# Patient Record
Sex: FEMALE | Race: WHITE | HISPANIC OR LATINO | Employment: FULL TIME | URBAN - METROPOLITAN AREA
[De-identification: names, ages, dates, MRNs, and addresses within clinical notes are randomized per-mention and may not be internally consistent; named-entity substitution may affect disease eponyms.]

---

## 2017-12-04 ENCOUNTER — HOSPITAL ENCOUNTER (EMERGENCY)
Facility: HOSPITAL | Age: 27
Discharge: HOME | End: 2017-12-04
Attending: EMERGENCY MEDICINE
Payer: COMMERCIAL

## 2017-12-04 ENCOUNTER — APPOINTMENT (EMERGENCY)
Dept: CT IMAGING | Facility: HOSPITAL | Age: 27
End: 2017-12-04
Payer: COMMERCIAL

## 2017-12-04 VITALS
TEMPERATURE: 97.4 F | WEIGHT: 108.03 LBS | SYSTOLIC BLOOD PRESSURE: 107 MMHG | RESPIRATION RATE: 16 BRPM | HEART RATE: 88 BPM | OXYGEN SATURATION: 100 % | DIASTOLIC BLOOD PRESSURE: 63 MMHG

## 2017-12-04 DIAGNOSIS — R10.9 ABDOMINAL PAIN: ICD-10-CM

## 2017-12-04 DIAGNOSIS — N83.209 RUPTURED OVARIAN CYST: Primary | ICD-10-CM

## 2017-12-04 LAB
ALBUMIN SERPL BCP-MCNC: 4.2 G/DL (ref 3.5–5)
ALP SERPL-CCNC: 82 U/L (ref 46–116)
ALT SERPL W P-5'-P-CCNC: 25 U/L (ref 12–78)
ANION GAP SERPL CALCULATED.3IONS-SCNC: 9 MMOL/L (ref 4–13)
APTT PPP: 30 SECONDS (ref 23–35)
AST SERPL W P-5'-P-CCNC: 21 U/L (ref 5–45)
BASOPHILS # BLD AUTO: 0.02 THOUSANDS/ΜL (ref 0–0.1)
BASOPHILS NFR BLD AUTO: 0 % (ref 0–1)
BILIRUB SERPL-MCNC: 0.8 MG/DL (ref 0.2–1)
BUN SERPL-MCNC: 15 MG/DL (ref 5–25)
CALCIUM SERPL-MCNC: 9.6 MG/DL (ref 8.3–10.1)
CHLORIDE SERPL-SCNC: 105 MMOL/L (ref 100–108)
CLARITY, POC: CLEAR
CO2 SERPL-SCNC: 26 MMOL/L (ref 21–32)
COLOR, POC: YELLOW
CREAT SERPL-MCNC: 0.7 MG/DL (ref 0.6–1.3)
EOSINOPHIL # BLD AUTO: 0.04 THOUSAND/ΜL (ref 0–0.61)
EOSINOPHIL NFR BLD AUTO: 1 % (ref 0–6)
ERYTHROCYTE [DISTWIDTH] IN BLOOD BY AUTOMATED COUNT: 12.1 % (ref 11.6–15.1)
EXT BILIRUBIN, UA: NEGATIVE
EXT BLOOD URINE: NORMAL
EXT GLUCOSE, UA: NEGATIVE
EXT KETONES: NEGATIVE
EXT NITRITE, UA: NEGATIVE
EXT PH, UA: 5
EXT PREG TEST URINE: NEGATIVE
EXT PROTEIN, UA: NEGATIVE
EXT SPECIFIC GRAVITY, UA: 1.01
EXT UROBILINOGEN: 0.2
GFR SERPL CREATININE-BSD FRML MDRD: 121 ML/MIN/1.73SQ M
GLUCOSE SERPL-MCNC: 93 MG/DL (ref 65–140)
HCT VFR BLD AUTO: 44.4 % (ref 34.8–46.1)
HGB BLD-MCNC: 14.9 G/DL (ref 11.5–15.4)
HOLD SPECIMEN: NORMAL
INR PPP: 0.9 (ref 0.86–1.16)
LIPASE SERPL-CCNC: 105 U/L (ref 73–393)
LYMPHOCYTES # BLD AUTO: 2 THOUSANDS/ΜL (ref 0.6–4.47)
LYMPHOCYTES NFR BLD AUTO: 29 % (ref 14–44)
MCH RBC QN AUTO: 29 PG (ref 26.8–34.3)
MCHC RBC AUTO-ENTMCNC: 33.6 G/DL (ref 31.4–37.4)
MCV RBC AUTO: 86 FL (ref 82–98)
MONOCYTES # BLD AUTO: 0.43 THOUSAND/ΜL (ref 0.17–1.22)
MONOCYTES NFR BLD AUTO: 6 % (ref 4–12)
NEUTROPHILS # BLD AUTO: 4.32 THOUSANDS/ΜL (ref 1.85–7.62)
NEUTS SEG NFR BLD AUTO: 64 % (ref 43–75)
PLATELET # BLD AUTO: 246 THOUSANDS/UL (ref 149–390)
PMV BLD AUTO: 10.3 FL (ref 8.9–12.7)
POTASSIUM SERPL-SCNC: 4.1 MMOL/L (ref 3.5–5.3)
PROT SERPL-MCNC: 7.8 G/DL (ref 6.4–8.2)
PROTHROMBIN TIME: 12.4 SECONDS (ref 12.1–14.4)
RBC # BLD AUTO: 5.14 MILLION/UL (ref 3.81–5.12)
SODIUM SERPL-SCNC: 140 MMOL/L (ref 136–145)
WBC # BLD AUTO: 6.81 THOUSAND/UL (ref 4.31–10.16)
WBC # BLD EST: NEGATIVE 10*3/UL

## 2017-12-04 PROCEDURE — 81002 URINALYSIS NONAUTO W/O SCOPE: CPT | Performed by: PHYSICIAN ASSISTANT

## 2017-12-04 PROCEDURE — 85025 COMPLETE CBC W/AUTO DIFF WBC: CPT | Performed by: PHYSICIAN ASSISTANT

## 2017-12-04 PROCEDURE — 80053 COMPREHEN METABOLIC PANEL: CPT | Performed by: PHYSICIAN ASSISTANT

## 2017-12-04 PROCEDURE — 36415 COLL VENOUS BLD VENIPUNCTURE: CPT | Performed by: PHYSICIAN ASSISTANT

## 2017-12-04 PROCEDURE — 96374 THER/PROPH/DIAG INJ IV PUSH: CPT

## 2017-12-04 PROCEDURE — 83690 ASSAY OF LIPASE: CPT | Performed by: PHYSICIAN ASSISTANT

## 2017-12-04 PROCEDURE — 81025 URINE PREGNANCY TEST: CPT | Performed by: PHYSICIAN ASSISTANT

## 2017-12-04 PROCEDURE — 74177 CT ABD & PELVIS W/CONTRAST: CPT

## 2017-12-04 PROCEDURE — 96375 TX/PRO/DX INJ NEW DRUG ADDON: CPT

## 2017-12-04 PROCEDURE — 99284 EMERGENCY DEPT VISIT MOD MDM: CPT

## 2017-12-04 PROCEDURE — 96361 HYDRATE IV INFUSION ADD-ON: CPT

## 2017-12-04 PROCEDURE — 85730 THROMBOPLASTIN TIME PARTIAL: CPT | Performed by: PHYSICIAN ASSISTANT

## 2017-12-04 PROCEDURE — 85610 PROTHROMBIN TIME: CPT | Performed by: PHYSICIAN ASSISTANT

## 2017-12-04 RX ORDER — KETOROLAC TROMETHAMINE 30 MG/ML
15 INJECTION, SOLUTION INTRAMUSCULAR; INTRAVENOUS ONCE
Status: COMPLETED | OUTPATIENT
Start: 2017-12-04 | End: 2017-12-04

## 2017-12-04 RX ORDER — ONDANSETRON 4 MG/1
4 TABLET, FILM COATED ORAL EVERY 8 HOURS PRN
Qty: 10 TABLET | Refills: 0 | Status: SHIPPED | OUTPATIENT
Start: 2017-12-04 | End: 2018-06-23

## 2017-12-04 RX ORDER — ONDANSETRON 2 MG/ML
4 INJECTION INTRAMUSCULAR; INTRAVENOUS ONCE
Status: COMPLETED | OUTPATIENT
Start: 2017-12-04 | End: 2017-12-04

## 2017-12-04 RX ORDER — NAPROXEN 500 MG/1
500 TABLET ORAL 2 TIMES DAILY WITH MEALS
Qty: 14 TABLET | Refills: 0 | Status: SHIPPED | OUTPATIENT
Start: 2017-12-04 | End: 2018-06-23

## 2017-12-04 RX ADMIN — IOHEXOL 100 ML: 350 INJECTION, SOLUTION INTRAVENOUS at 10:02

## 2017-12-04 RX ADMIN — ONDANSETRON 4 MG: 2 INJECTION INTRAMUSCULAR; INTRAVENOUS at 10:34

## 2017-12-04 RX ADMIN — KETOROLAC TROMETHAMINE 15 MG: 30 INJECTION, SOLUTION INTRAMUSCULAR at 10:32

## 2017-12-04 RX ADMIN — SODIUM CHLORIDE 1000 ML: 0.9 INJECTION, SOLUTION INTRAVENOUS at 08:39

## 2017-12-04 NOTE — DISCHARGE INSTRUCTIONS
Abdominal Pain, Ambulatory Care   GENERAL INFORMATION:   Abdominal pain  can be dull, achy, or sharp  You may have pain in one area of your abdomen, or in your entire abdomen  Your pain may be caused by constipation, food sensitivity or poisoning, infection, or a blockage  Abdominal pain can also be caused by a hernia, appendicitis, or an ulcer  The cause of your abdominal pain may be unknown  Seek immediate care for the following symptoms:   · New chest pain or shortness of breath    · Pulsing pain in your upper abdomen or lower back that suddenly becomes constant    · Pain in the right lower abdominal area that worsens with movement    · Fever over 100 4°F (38°C) or shaking chills    · Vomiting and you cannot keep food or fluids down    · Pain does not improve or gets worse over the next 8 to 12 hours    · Blood in your vomit or bowel movements, or they look black and tarry    · Skin or the whites of your eyes turn yellow    · Large amount of vaginal bleeding that is not your monthly period  Treatment for abdominal pain  may include medicine to calm your stomach, prevent vomiting, or decrease pain  Follow up with your healthcare provider as directed:  Write down your questions so you remember to ask them during your visits  CARE AGREEMENT:   You have the right to help plan your care  Learn about your health condition and how it may be treated  Discuss treatment options with your caregivers to decide what care you want to receive  You always have the right to refuse treatment  The above information is an  only  It is not intended as medical advice for individual conditions or treatments  Talk to your doctor, nurse or pharmacist before following any medical regimen to see if it is safe and effective for you  © 2014 [address] is for End User's use only and may not be sold, redistributed or otherwise used for commercial purposes   All illustrations and images included in [facility] are the copyrighted property of [employer]  or [employer] Cedeno  Ruptured Ovarian Cyst   WHAT YOU NEED TO KNOW:   A ruptured ovarian cyst is a cyst that breaks open  A cyst is a sac that grows on an ovary  This sac usually contains fluid, but may sometimes have blood or tissue in it  A large cyst that ruptures may lead to problems that need immediate care  It is important to follow up with your healthcare provider to make sure your cyst went away completely with treatment  DISCHARGE INSTRUCTIONS:   Call 911 for any of the following:   · You are too weak or dizzy to stand up  Return to the emergency department if:   · You have severe pain in your pelvis or in your abdomen  · You have pain along with a fever, nausea, or vomiting  · You have signs of shock from blood loss, such as dizziness, cold or clammy skin, or fast breathing  Contact your healthcare provider if:   · You notice changes in your monthly periods, or you begin to have nausea or vomiting with your periods  · You have new or worsening symptoms  · Your pain does not get better with pain medicine  · You have pain during sex  · You have bleeding from your vagina that is not your period  · Your abdomen is swollen, or you have a full or heavy feeling in your lower abdomen  · You have trouble urinating  · You have questions or concerns about your condition or care  Medicines: You may need any of the following:  · NSAIDs , such as ibuprofen, help decrease swelling, pain, and fever  This medicine is available with or without a doctor's order  NSAIDs can cause stomach bleeding or kidney problems in certain people  If you take blood thinner medicine, always ask if NSAIDs are safe for you  Always read the medicine label and follow directions  Do not give these medicines to children under 10months of age without direction from your child's healthcare provider       · Antibiotics  may be given to prevent or fight an infection caused by bacteria  · Take your medicine as directed  Contact your healthcare provider if you think your medicine is not helping or if you have side effects  Tell him or her if you are allergic to any medicine  Keep a list of the medicines, vitamins, and herbs you take  Include the amounts, and when and why you take them  Bring the list or the pill bottles to follow-up visits  Carry your medicine list with you in case of an emergency  Manage or prevent a ruptured ovarian cyst:   · Apply heat where you have pain, as directed  Heat can help relieve mild pain  Use a heating pad (set on low) or hot water bottle  Wrap the pad or bottle in a towel before you apply it to your skin  Apply heat for 20 minutes every hour, or as directed  A warm bath may also help relieve the pain  · Ask when to come in for a follow-up examination  You may need another ultrasound 6 weeks after your cyst was treated  This will help make sure the cyst is no longer growing or causing health problems  You may also need ultrasound tests for 2 or 3 monthly periods to see how hormones affect your ovaries  · Ask about birth control pills  These may help reduce your risk for cysts  Ask your healthcare provider if birth control pills are right for you  The risk for a blood clot is higher if you take birth control pills, especially if you are older than 35 or smoke  · Have a pelvic exam every year  This may also be called a well woman visit  The exam will include a Pap smear to check for certain cancers  Your healthcare provider will also press on your abdomen to check for lumps or other problems  A pelvic exam can help find problems early  This makes treatment easier and more effective  Tell your healthcare provider if you notice any changes in your monthly periods   Examples include periods that start on a different day than usual, or are lighter or heavier than usual  Tell your provider if you have worse pain than usual, or if the pain is different than you had before  Follow up with your healthcare provider as directed:  Write down your questions so you remember to ask them during your visits  © 2017 [address] Information is for End User's use only and may not be sold, redistributed or otherwise used for commercial purposes  All illustrations and images included in CareNotes® are the copyrighted property of [employer] M [employer]  or Israel Cedeno  The above information is an  only  It is not intended as medical advice for individual conditions or treatments  Talk to your doctor, nurse or pharmacist before following any medical regimen to see if it is safe and effective for you

## 2017-12-04 NOTE — ED NOTES
Per pt, she had bad experience with previous CT scan  Per pt "they dye burned my veins"  CT notified, per CT they will talk with pt prior to CT       Rennis Sicard, RN  12/04/17 0358

## 2017-12-04 NOTE — ED NOTES
Pt complaining about IV bothering her  Pt states that it feels like pins and needles going down arm  Checked IV and had good blood return and slowed down her fluids  Pt states that it feels better        Coit Randall, RN  12/04/17 2814

## 2017-12-04 NOTE — ED PROVIDER NOTES
History  Chief Complaint   Patient presents with    Abdominal Pain     PT HERE FOR ABD PAIN SINCE 3 DAYS AGO  STARTED WITH A MIGRAINE AND TOOK otc MEDICATION AND SINCE THEN HAS BEEN FEELING NAUSEA AND PAIN  This 17-year-old female presents emergency room with a 3 day history of initially having a migraine and taking some Tylenol Sinus with 1 Advil with relief of her symptoms  She then noticed some periumbilical abdominal cramping with a sharp component occasionally  She complains of increased belching and flatus  She has had a decreased appetite  She states that when she moves around or bends over the pain is worsened  She noticed as at the end of urination that she has increased pain in her right lower quadrant  She has a history of having previous ovarian cyst   She started her period 2 days ago  She states this normal flow usually uses 3 pads per day and it lasts approximately 5 days in duration  She states there is no change with this period  She denies any fever chills  She denies any urinary frequency or hematuria  She denies any vaginal discharge  She is presently not sexually active  She has been sexually active in the past   She complains of nausea but no active vomiting  She denies any diarrhea but has not moved her bowels for 2 days  Past medical history is positive for migraines, recent sinus infection that she was treated with the Augmentin and prednisone, 2 weeks ago          History provided by:  Patient and parent  Abdominal Pain   Pain location:  Periumbilical  Pain quality: cramping and sharp    Pain radiates to:  RLQ  Pain severity:  Moderate  Onset quality:  Gradual  Duration:  3 days  Timing:  Constant  Progression:  Waxing and waning  Chronicity:  Recurrent (similiar to previous ovarian cyst)  Context: not alcohol use, not awakening from sleep, not diet changes, not eating, not laxative use, not medication withdrawal, not previous surgeries, not recent illness, not recent sexual activity, not recent travel, not retching, not sick contacts, not suspicious food intake and not trauma    Relieved by:  Nothing  Worsened by: Movement and urination  Ineffective treatments: motrin  Associated symptoms: anorexia, belching and constipation    Associated symptoms: no chest pain, no chills, no cough, no diarrhea, no dysuria, no fatigue, no fever, no flatus, no hematemesis, no hematochezia, no hematuria, no melena, no nausea, no shortness of breath, no sore throat, no vaginal bleeding, no vaginal discharge and no vomiting    Associated symptoms comment:  Currently having menses, started 2 days ago  Three pads per day usually last for 5 days total   Risk factors: no alcohol abuse, no aspirin use, not elderly, has not had multiple surgeries, no NSAID use, not obese, not pregnant and no recent hospitalization        None       Past Medical History:   Diagnosis Date    Asthma     Migraine     Ovarian cyst        History reviewed  No pertinent surgical history  History reviewed  No pertinent family history  I have reviewed and agree with the history as documented  Social History   Substance Use Topics    Smoking status: Never Smoker    Smokeless tobacco: Never Used    Alcohol use Yes        Review of Systems   Constitutional: Positive for activity change and appetite change  Negative for chills, diaphoresis, fatigue and fever  HENT: Negative for congestion, dental problem, ear discharge, ear pain, mouth sores, postnasal drip, rhinorrhea, sinus pressure and sore throat  Eyes: Negative for pain, discharge, redness and itching  Respiratory: Negative for cough, chest tightness and shortness of breath  Cardiovascular: Negative for chest pain and palpitations  Gastrointestinal: Positive for abdominal pain, anorexia and constipation  Negative for diarrhea, flatus, hematemesis, hematochezia, melena, nausea and vomiting     Genitourinary: Negative for dysuria, flank pain, frequency, hematuria, pelvic pain, urgency, vaginal bleeding and vaginal discharge  Musculoskeletal: Positive for back pain  Negative for arthralgias, gait problem and myalgias  Skin: Negative for rash  Hematological: Negative for adenopathy  Does not bruise/bleed easily  Psychiatric/Behavioral: Negative for confusion  All other systems reviewed and are negative  Physical Exam  ED Triage Vitals   Temperature Pulse Respirations Blood Pressure SpO2   12/04/17 0754 12/04/17 0751 12/04/17 0751 12/04/17 0751 12/04/17 0751   (!) 97 4 °F (36 3 °C) 93 18 130/94 100 %      Temp Source Heart Rate Source Patient Position - Orthostatic VS BP Location FiO2 (%)   12/04/17 0754 12/04/17 0751 12/04/17 0751 12/04/17 0751 --   Oral Monitor Sitting Right arm       Pain Score       12/04/17 0751       2           Orthostatic Vital Signs  Vitals:    12/04/17 0751 12/04/17 1015 12/04/17 1025   BP: 130/94 114/71 107/63   Pulse: 93 80 88   Patient Position - Orthostatic VS: Sitting Sitting Lying       Physical Exam   Constitutional: She is oriented to person, place, and time  She appears well-developed and well-nourished  No distress  HENT:   Head: Normocephalic  Right Ear: External ear normal    Left Ear: External ear normal    Nose: Nose normal    Mouth/Throat: Oropharynx is clear and moist    Eyes: Conjunctivae are normal  Right eye exhibits no discharge  Left eye exhibits no discharge  No scleral icterus  Neck: No JVD present  No tracheal deviation present  No thyromegaly present  Cardiovascular: Normal rate, regular rhythm and normal heart sounds  Exam reveals no gallop and no friction rub  No murmur heard  Pulmonary/Chest: Effort normal and breath sounds normal  No stridor  No respiratory distress  She has no wheezes  She has no rales  Abdominal: Soft  There is tenderness  There is guarding  There is no rebound  Tenderness and guarding in the right lower quadrant  No rebound tenderness     Musculoskeletal: She exhibits no edema  Lymphadenopathy:     She has no cervical adenopathy  Neurological: She is alert and oriented to person, place, and time  Skin: Skin is warm  Capillary refill takes less than 2 seconds  No rash noted  She is not diaphoretic  No erythema  Psychiatric: She has a normal mood and affect  Her behavior is normal  Judgment and thought content normal    Nursing note and vitals reviewed  ED Medications  Medications   sodium chloride 0 9 % bolus 1,000 mL (0 mL Intravenous Stopped 12/4/17 1042)   iohexol (OMNIPAQUE) 350 MG/ML injection (MULTI-DOSE) 100 mL (100 mL Intravenous Given 12/4/17 1002)   ketorolac (TORADOL) 30 mg/mL injection 15 mg (15 mg Intravenous Given 12/4/17 1032)   ondansetron (ZOFRAN) injection 4 mg (4 mg Intravenous Given 12/4/17 1034)       Diagnostic Studies  Results Reviewed     Procedure Component Value Units Date/Time    [place] draw [[id number]] Collected:  12/04/17 0840    Lab Status: In process Specimen:  Blood Updated:  12/04/17 1001    Narrative: The following orders were created for panel order [place] draw  Procedure                               Abnormality         Status                     ---------                               -----------         ------                     Wilene Pilar top on WFUB[[id number]]                                  In process                 Green / Black tube on [facility][[id number]]                        Final result                 Please view results for these tests on the individual orders      Comprehensive metabolic panel [[id number]] Collected:  12/04/17 0840    Lab Status:  Final result Specimen:  Blood from Arm, Right Updated:  12/04/17 0913     Sodium 140 mmol/L      Potassium 4 1 mmol/L      Chloride 105 mmol/L      CO2 26 mmol/L      Anion Gap 9 mmol/L      BUN 15 mg/dL      Creatinine 0 70 mg/dL      Glucose 93 mg/dL      Calcium 9 6 mg/dL      AST 21 U/L      ALT 25 U/L      Alkaline Phosphatase 82 U/L      Total Protein 7 8 g/dL Albumin 4 2 g/dL      Total Bilirubin 0 80 mg/dL      eGFR 121 ml/min/1 73sq m     Narrative:         National Kidney Disease Education Program recommendations are as follows:  GFR calculation is accurate only with a steady state creatinine  Chronic Kidney disease less than 60 ml/min/1 73 sq  meters  Kidney failure less than 15 ml/min/1 73 sq  meters  Lipase [[id number]]  (Normal) Collected:  12/04/17 0840    Lab Status:  Final result Specimen:  Blood from Arm, Right Updated:  12/04/17 0913     Lipase 105 u/L     Protime-INR [[id number]]  (Normal) Collected:  12/04/17 0840    Lab Status:  Final result Specimen:  Blood from Arm, Right Updated:  12/04/17 0903     Protime 12 4 seconds      INR 0 90    APTT [[id number]]  (Normal) Collected:  12/04/17 0840    Lab Status:  Final result Specimen:  Blood from Arm, Right Updated:  12/04/17 0903     PTT 30 seconds     Narrative:          Therapeutic Heparin Range = 60-90 seconds    CBC and differential [[id number]]  (Abnormal) Collected:  12/04/17 0840    Lab Status:  Final result Specimen:  Blood from Arm, Right Updated:  12/04/17 0851     WBC 6 81 Thousand/uL      RBC 5 14 (H) Million/uL      Hemoglobin 14 9 g/dL      Hematocrit 44 4 %      MCV 86 fL      MCH 29 0 pg      MCHC 33 6 g/dL      RDW 12 1 %      MPV 10 3 fL      Platelets 997 Thousands/uL      Neutrophils Relative 64 %      Lymphocytes Relative 29 %      Monocytes Relative 6 %      Eosinophils Relative 1 %      Basophils Relative 0 %      Neutrophils Absolute 4 32 Thousands/µL      Lymphocytes Absolute 2 00 Thousands/µL      Monocytes Absolute 0 43 Thousand/µL      Eosinophils Absolute 0 04 Thousand/µL      Basophils Absolute 0 02 Thousands/µL     POCT urinalysis dipstick [[id number]]  (Normal) Resulted:  12/04/17 0843    Lab Status:  Final result Specimen:  Urine Updated:  12/04/17 0843     Color, UA yellow     Clarity, UA clear     EXT Glucose, UA negative     EXT Bilirubin, UA (Ref: Negative) negative     EXT Ketones, UA (Ref: Negative) negative     EXT Spec Grav, UA 1 010     EXT Blood, UA (Ref: Negative) hemolyzed trace     EXT pH, UA 5 0     EXT Protein, UA (Ref: Negative) negative     EXT Urobilinogen, UA (Ref: 0 2- 1 0) 0 2     EXT Leukocytes, UA (Ref: Negative) negative     EXT Nitrite, UA (Ref: Negative) negative    POCT pregnancy, urine [[id number]]  (Normal) Resulted:  12/04/17 0842    Lab Status:  Final result Updated:  12/04/17 0843     EXT PREG TEST UR (Ref: Negative) negative                 CT abdomen pelvis with contrast   ED Interpretation by Yanick Jimenez PA-C (12/04 1021)   No CT evidence of acute appendicitis  Involuting bilateral ovarian follicles with small amount of fluid in the right adnexal extending into the pelvis physiologic  Final Result by Amanda Dangelo DO (12/04 1015)   1  No CT evidence of acute appendicitis  2   Involuting bilateral ovarian follicles with small amount of fluid in the right adnexa, extending into the pelvis, physiologic  A verbal report will be called by the reading room liaison following this dictation  Workstation performed: [id number]                    Procedures  Procedures       Phone Contacts  ED Phone Contact    ED Course  ED Course                                MDM  Number of Diagnoses or Management Options  Abdominal pain: new and requires workup  Ruptured ovarian cyst: new and requires workup     Amount and/or Complexity of Data Reviewed  Clinical lab tests: ordered and reviewed  Tests in the radiology section of CPT®: ordered and reviewed  Tests in the medicine section of CPT®: ordered and reviewed    Risk of Complications, Morbidity, and/or Mortality  Presenting problems: high  Diagnostic procedures: high  Management options: high  General comments: Patient presents to the emergency room in 3 day history of periumbilical pain that now is located in the right lower quadrant    She describes the pain as a crampy but does have a sharp component  She states she started her period, 3 days ago which has been normal  Her laboratory studies were reviewed  A CT scan was obtained to rule out appendicitis versus ovarian cyst   The CT scan results that demonstrated a ruptured ovarian cyst with normal physiological fluid  Patient was medicated with Toradol and sent home with a prescription for Naprosyn  She was instructed to follow up with her gynecologist within the next week due to the fact that  this is a recurrence problem  Patient Progress  Patient progress: stable    CritCare Time    Disposition  Final diagnoses:   Ruptured ovarian cyst - Acute right   Abdominal pain     Time reflects when diagnosis was documented in both MDM as applicable and the Disposition within this note     Time User Action Codes Description Comment    12/4/2017 10:22 AM White Res Add [N83 209] Ruptured ovarian cyst     12/4/2017 10:22 AM White Res Modify [N83 209] Ruptured ovarian cyst Acute right    12/4/2017 10:22 AM White Res Add [R10 9] Abdominal pain       ED Disposition     ED Disposition Condition Comment    Discharge  [facility] discharge to home/self care  Condition at discharge: Good        Follow-up Information     Follow up With Specialties Details Why Contact Info    Your gynecologist  Schedule an appointment as soon as possible for a visit in 1 week          Discharge Medication List as of 12/4/2017 10:31 AM      START taking these medications    Details   naproxen (NAPROSYN) 500 mg tablet Take 1 tablet by mouth 2 (two) times a day with meals, Starting Mon 12/4/2017, Print      ondansetron (ZOFRAN) 4 mg tablet Take 1 tablet by mouth every 8 (eight) hours as needed for nausea or vomiting for up to 10 doses, Starting Mon 12/4/2017, Print           No discharge procedures on file      ED Provider  Electronically Signed by           Eliecer Wong PA-C  12/04/17 0762

## 2017-12-05 ENCOUNTER — GENERIC CONVERSION - ENCOUNTER (OUTPATIENT)
Dept: OTHER | Facility: OTHER | Age: 27
End: 2017-12-05

## 2018-01-24 VITALS
HEIGHT: 60 IN | DIASTOLIC BLOOD PRESSURE: 62 MMHG | BODY MASS INDEX: 21.01 KG/M2 | SYSTOLIC BLOOD PRESSURE: 108 MMHG | WEIGHT: 107 LBS | HEART RATE: 81 BPM

## 2018-06-23 ENCOUNTER — HOSPITAL ENCOUNTER (EMERGENCY)
Facility: HOSPITAL | Age: 28
Discharge: HOME | End: 2018-06-23
Attending: EMERGENCY MEDICINE | Admitting: EMERGENCY MEDICINE
Payer: COMMERCIAL

## 2018-06-23 VITALS
DIASTOLIC BLOOD PRESSURE: 66 MMHG | BODY MASS INDEX: 20.88 KG/M2 | WEIGHT: 106.92 LBS | HEART RATE: 94 BPM | TEMPERATURE: 97.7 F | RESPIRATION RATE: 16 BRPM | SYSTOLIC BLOOD PRESSURE: 118 MMHG | OXYGEN SATURATION: 100 %

## 2018-06-23 DIAGNOSIS — S61.002A AVULSION OF SKIN OF LEFT THUMB, INITIAL ENCOUNTER: Primary | ICD-10-CM

## 2018-06-23 PROCEDURE — 99282 EMERGENCY DEPT VISIT SF MDM: CPT

## 2018-06-23 RX ORDER — LIDOCAINE HYDROCHLORIDE 10 MG/ML
5 INJECTION, SOLUTION EPIDURAL; INFILTRATION; INTRACAUDAL; PERINEURAL ONCE
Status: COMPLETED | OUTPATIENT
Start: 2018-06-23 | End: 2018-06-23

## 2018-06-23 RX ADMIN — LIDOCAINE HYDROCHLORIDE 5 ML: 10 INJECTION, SOLUTION EPIDURAL; INFILTRATION; INTRACAUDAL; PERINEURAL at 12:17

## 2018-06-23 NOTE — DISCHARGE INSTRUCTIONS
Skin Avulsion   WHAT YOU NEED TO KNOW:   Skin avulsion is a wound that happens when skin is torn from your body during an accident or other injury  The torn skin may be lost or too damaged to be repaired, and it must be removed  A wound of this type cannot be stitched closed because there is tissue missing  Avulsion wounds are usually bigger and have more scars because of the missing tissue  DISCHARGE INSTRUCTIONS:   Medicines:   · Antibiotic ointment:  Your healthcare provider may tell you to gently rub a topical antibiotic ointment on your wound  This will help prevent an infection and help your wound heal faster  · Pain medicine: You may be given medicine to take away or decrease pain  Do not wait until the pain is severe before you take your medicine  · NSAIDs , such as ibuprofen, help decrease swelling, pain, and fever  This medicine is available with or without a doctor's order  NSAIDs can cause stomach bleeding or kidney problems in certain people  If you take blood thinner medicine, always ask if NSAIDs are safe for you  Always read the medicine label and follow directions  Do not give these medicines to children under 10months of age without direction from your child's healthcare provider  · Take your medicine as directed  Contact your healthcare provider if you think your medicine is not helping or if you have side effects  Tell him of her if you are allergic to any medicine  Keep a list of the medicines, vitamins, and herbs you take  Include the amounts, and when and why you take them  Bring the list or the pill bottles to follow-up visits  Carry your medicine list with you in case of an emergency  Care for your wound:  Avulsion wounds may take longer to heal because they cannot be closed with tape or stitches  Keep your wound clean and protected to prevent infection and speed healing  · Clean your wound:  Wash your hands with soap and water before and after you care for your wound  You may be able to use a soft cloth to gently clean the wound after the first 24 to 48 hours  After that, gently clean the wound once or twice a day with cool water  Do not soak your wound  Use soap to clean around the wound, but try not to get any on the wound itself  Do not use alcohol or hydrogen peroxide to clean your wound unless you are directed to  Gently pat the area dry and reapply the bandage as directed  · Elevate your wound:  Prop your injured area on pillows to raise it above the level of your heart  This will help reduce pain and swelling  Do this for 30 minutes at a time, as often as you can  · Bandage your wound:  Bandages keep your wound clean, dry, and protected from infection  They may also prevent swelling  Use a bandage that does not stick to your wound, and has a spongy layer to absorb fluids  Leave your bandage on as long as directed  Ask your healthcare provider when and how to change your bandage  Do not wrap the bandage too tightly  This could cut off blood flow and cause more injury  · Use cool compresses:  Wet a washcloth or towel with cool water and hold it on your wound as directed  Ask how often to apply the compress and for how long each time  · Reduce scarring:  Avoid direct sunlight on your wound  Sunlight may burn or change the color of the new skin over your wound  Use sunscreen (SPF 30 or higher) on the new skin for at least 1 year after it heals  Support for leg and arm wounds: You may need to use crutches if the wound is on your leg  You may need to use a sling if the wound is on your arm  Crutches and slings help protect the injured area, prevent further injury, and heal the area in the right position  Follow up with your healthcare provider within 2 days or as directed: If you have stitches, ask when to return to have them removed  Write down your questions so you remember to ask them during your visits     Contact your healthcare provider if:   · You have new pain, or it gets worse  · You have trouble moving the injured body area  · Your wound splits open or does not seem to be healing  Return to the emergency department if:   · You have a fever  · You have painful swelling, redness, or warmth around your wound  · Your wound is red and there are red streaks on your skin starting at your wound and moving upward  · Your wound is draining pus  · You have heavy bleeding or bleeding that does not stop after 10 minutes of holding firm, direct pressure over the wound  · You feel like there is an object stuck in your wound  © 2017 [employer] Information is for End User's use only and may not be sold, redistributed or otherwise used for commercial purposes  All illustrations and images included in CareNotes® are the copyrighted property of A D A M , Inc  or Israel Cedeno  The above information is an  only  It is not intended as medical advice for individual conditions or treatments  Talk to your doctor, nurse or pharmacist before following any medical regimen to see if it is safe and effective for you

## 2018-06-23 NOTE — ED PROVIDER NOTES
History  Chief Complaint   Patient presents with    Finger Laceration     left thumb laceration from figure skate 30 minutes PTA, pt reports wont stop bleeding, "powder" and bandage applied at scene     25 y/o F with PMhx of asthma, migraines, right hand dominant, who presents to the ED for left thumb laceration s/p slicing it against her skate approximately 30 minutes prior to arrival  She applied a coagulation powder to stop the bleeding  Currently no active bleeding  Denies numbness, tingling, weakness  Denies erythema or swelling  Denies fevers, chills  Believes that tetanus UTD 8 years ago  Does not wish to have repeat tetanus today  History provided by:  Patient   used: No        Prior to Admission Medications   Prescriptions Last Dose Informant Patient Reported? Taking? Mometasone Furo-Formoterol Fum (DULERA IN)   Yes Yes   Sig: Inhale      Facility-Administered Medications: None       Past Medical History:   Diagnosis Date    Asthma     Migraine     Ovarian cyst        History reviewed  No pertinent surgical history  History reviewed  No pertinent family history  I have reviewed and agree with the history as documented  Social History   Substance Use Topics    Smoking status: Never Smoker    Smokeless tobacco: Never Used    Alcohol use Yes      Comment: socially        Review of Systems   HENT: Negative  Eyes: Negative  Respiratory: Negative  Cardiovascular: Negative  Gastrointestinal: Negative  Genitourinary: Negative  Musculoskeletal: Negative  Skin: Positive for wound  Neurological: Negative  Psychiatric/Behavioral: Negative  Physical Exam  Physical Exam   Constitutional: She is oriented to person, place, and time  She appears well-developed and well-nourished  No distress  HENT:   Head: Normocephalic and atraumatic  Eyes: Conjunctivae and EOM are normal  Pupils are equal, round, and reactive to light     Neck: Normal range of motion  Neck supple  Cardiovascular: Normal rate, regular rhythm and intact distal pulses  Pulmonary/Chest: Effort normal and breath sounds normal  She has no wheezes  She has no rales  Abdominal: Soft  Bowel sounds are normal  She exhibits no distension  There is no tenderness  There is no rebound and no guarding  Musculoskeletal: Normal range of motion  She exhibits no edema or tenderness  Neurological: She is alert and oriented to person, place, and time  No cranial nerve deficit or sensory deficit  She exhibits normal muscle tone  Coordination normal    Skin: Skin is warm and dry  Capillary refill takes less than 2 seconds  She is not diaphoretic  0 5 cm skin avulsion to the distal left thumb that does not involve the nail  Superficial  No active bleeding  Some black foreign body, likely the coagulation powder, in the wound  Psychiatric: She has a normal mood and affect  Her behavior is normal    Nursing note and vitals reviewed        Vital Signs  ED Triage Vitals   Temperature Pulse Respirations Blood Pressure SpO2   06/23/18 0940 06/23/18 0939 06/23/18 0939 06/23/18 0940 06/23/18 0939   97 7 °F (36 5 °C) (!) 122 20 131/77 100 %      Temp Source Heart Rate Source Patient Position - Orthostatic VS BP Location FiO2 (%)   06/23/18 0940 06/23/18 0939 06/23/18 0939 06/23/18 0939 --   Oral Monitor Sitting Right arm       Pain Score       06/23/18 0939       2           Vitals:    06/23/18 0939 06/23/18 0940 06/23/18 1258   BP:  131/77 118/66   Pulse: (!) 122  94   Patient Position - Orthostatic VS: Sitting  Sitting       Visual Acuity      ED Medications  Medications   lidocaine (PF) (XYLOCAINE-MPF) 1 % injection 5 mL (5 mL Infiltration Given 6/23/18 1217)       Diagnostic Studies  Results Reviewed     None                 No orders to display              Procedures  Lac Repair  Date/Time: 6/23/2018 12:58 PM  Performed by: Amy Garsia  Authorized by: Marii Elias   Consent: Verbal consent obtained  Risks and benefits: risks, benefits and alternatives were discussed  Consent given by: patient  Body area: upper extremity  Location details: left thumb  Laceration length: 1 cm  Contamination: The wound is contaminated  Foreign bodies: unknown  Tendon involvement: none  Nerve involvement: none  Vascular damage: no  Anesthesia: digital block    Anesthesia:  Local Anesthetic: lidocaine 1% without epinephrine  Anesthetic total: 2 mL      Procedure Details:  Preparation: Patient was prepped and draped in the usual sterile fashion  Irrigation solution: saline  Irrigation method: jet lavage  Amount of cleaning: extensive  Debridement: minimal  Skin closure: glue  Dressing: gauze roll  Patient tolerance: Patient tolerated the procedure well with no immediate complications             Phone Contacts  ED Phone Contact    ED Course                               MDM  Number of Diagnoses or Management Options  Avulsion of skin of left thumb, initial encounter:   Diagnosis management comments: 23 y/o F with PMhx of asthma, migraines, right hand dominant, who presents to the ED for left thumb laceration s/p slicing it against her skate approximately 30 minutes prior to arrival    Differential Diagnosis includes but is not limited to: laceration repair  Low suspicion for bony involvement as very superficial    Wound irrigated and repaired  Glue applied  Tetanus UTD 8 years ago  Dc home with pmd follow up       CritCare Time    Disposition  Final diagnoses:   Avulsion of skin of left thumb, initial encounter     Time reflects when diagnosis was documented in both MDM as applicable and the Disposition within this note     Time User Action Codes Description Comment    6/23/2018 12:51 PM Geovany Sumner Add [X11 741,  G89 29] Chronic hip pain, right     6/23/2018 12:52 PM Geovany Sumner Remove [H11 728,  G89 29] Chronic hip pain, right     6/23/2018 12:52 PM Danna Tucker Add [S61 002A] Avulsion of skin of left thumb, initial encounter       ED Disposition     ED Disposition Condition Comment    Discharge  [facility] discharge to home/self care  Condition at discharge: Good        Follow-up Information     Follow up With Specialties Details Why Pasquale East MD Internal Medicine In 1 week As needed [address]  [phone]  [id number]  [phone]            Discharge Medication List as of 6/23/2018  1:05 PM      CONTINUE these medications which have NOT CHANGED    Details   Mometasone Furo-Formoterol Fum (DULERA IN) Inhale, Historical Med           No discharge procedures on file      ED Provider  Electronically Signed by           Marques Burton PA-C  06/23/18 3835

## 2021-08-31 ENCOUNTER — ANNUAL EXAM (OUTPATIENT)
Dept: OBGYN CLINIC | Facility: CLINIC | Age: 31
End: 2021-08-31
Payer: COMMERCIAL

## 2021-08-31 VITALS
DIASTOLIC BLOOD PRESSURE: 60 MMHG | HEIGHT: 59 IN | BODY MASS INDEX: 21.37 KG/M2 | SYSTOLIC BLOOD PRESSURE: 102 MMHG | WEIGHT: 106 LBS

## 2021-08-31 DIAGNOSIS — Z01.419 WOMEN'S ANNUAL ROUTINE GYNECOLOGICAL EXAMINATION: Primary | ICD-10-CM

## 2021-08-31 PROBLEM — G43.909 MIGRAINE: Status: ACTIVE | Noted: 2021-08-31

## 2021-08-31 PROBLEM — J45.909 ASTHMA: Status: ACTIVE | Noted: 2017-12-05

## 2021-08-31 PROCEDURE — 99385 PREV VISIT NEW AGE 18-39: CPT | Performed by: NURSE PRACTITIONER

## 2021-08-31 RX ORDER — ALBUTEROL SULFATE 90 UG/1
2 AEROSOL, METERED RESPIRATORY (INHALATION) EVERY 6 HOURS PRN
COMMUNITY
Start: 2021-01-15 | End: 2022-01-15

## 2021-08-31 RX ORDER — NAPROXEN 500 MG/1
TABLET ORAL
COMMUNITY

## 2021-08-31 RX ORDER — ONDANSETRON 4 MG/1
TABLET, FILM COATED ORAL
COMMUNITY

## 2021-08-31 NOTE — PROGRESS NOTES
Subjective    HPI:     Morris Law is a 31 y o  nulliparous female  She is engaged and getting  in May 2022  Her menstrual cycles are about 40 days apart and most recently she has noticed they are becoming closer  Her current method of contraception includes condoms  She denies issues with intimacy  She denies /GI and Gyn complaints  She is prone to UTI's  She does have a history recurrent ovarian cyst  She feels safe at home  She denies depression/anxiety  Medical, surgical and family history reviewed  Her dental care is up-to-date  She eats a healthy diet and exercises regularly  She is happy with her weight  Gynecologic History    Patient's last menstrual period was 2021  Gardasil Vaccine Series: did not receive  Will contact insurance to see if it covered  Last Pap: years ago      Obstetric History    OB History    Para Term  AB Living   0 0 0 0 0 0   SAB TAB Ectopic Multiple Live Births   0 0 0 0 0       The following portions of the patient's history were reviewed and updated as appropriate: allergies, current medications, past family history, past medical history, past social history, past surgical history and problem list     Review of Systems    Pertinent items are noted in HPI  Objective    Physical Exam  Constitutional:       Appearance: Normal appearance  She is well-developed  Genitourinary:      Pelvic exam was performed with patient in the lithotomy position  Vulva, inguinal canal, urethra, bladder, vagina, uterus, right adnexa and left adnexa normal       No posterior fourchette tenderness, injury, rash or lesion present  Cervix is nulliparous  No cervical motion tenderness, discharge, friability, lesion, erythema, bleeding, polyp or nabothian cyst       Uterus is anteverted  No right or left adnexal mass present  Right adnexa not tender or full  Left adnexa not tender or full     HENT:      Head: Normocephalic and atraumatic  Neck:      Thyroid: No thyromegaly  Cardiovascular:      Rate and Rhythm: Normal rate and regular rhythm  Heart sounds: Normal heart sounds, S1 normal and S2 normal    Pulmonary:      Effort: Pulmonary effort is normal       Breath sounds: Normal breath sounds  Chest:      Breasts: Breasts are symmetrical          Right: Normal  No inverted nipple, mass, nipple discharge, skin change or tenderness  Left: Normal  No inverted nipple, mass, nipple discharge, skin change or tenderness  Abdominal:      General: Bowel sounds are normal  There is no distension  Palpations: Abdomen is soft  There is no mass  Tenderness: There is no abdominal tenderness  There is no guarding  Musculoskeletal:      Cervical back: Neck supple  Lymphadenopathy:      Cervical: No cervical adenopathy  Upper Body:      Right upper body: No supraclavicular or axillary adenopathy  Left upper body: No supraclavicular or axillary adenopathy  Neurological:      Mental Status: She is alert  Skin:     General: Skin is warm and dry  Findings: No rash  Psychiatric:         Attention and Perception: Attention and perception normal          Mood and Affect: Mood and affect normal          Speech: Speech normal          Behavior: Behavior is cooperative  Thought Content: Thought content normal          Cognition and Memory: Cognition and memory normal          Judgment: Judgment normal    Vitals and nursing note reviewed  Assessment and 2021 Maria Isabel Soliz was seen today for gynecologic exam     Diagnoses and all orders for this visit:    Women's annual routine gynecological examination      Patient informed of a Stable GYN exam  A pap smear was performed  I have discussed the importance of exercise and healthy diet as well as adequate intake of calcium and vitamin D  The current ASCCP guidelines were reviewed   The low risk patient will receive pap smear screening every 3 years until the age of 34 and then every 3 to 5 years with HPV co-testing from the ages of 33-67  I emphasized the importance of an annual pelvic and breast exam  A yearly mammogram is recommended for breast cancer screening starting at age 36  [facility], if abnormal will call to review and discuss treatment plan  All questions have been answered to her satisfaction  Follow up in: 1 year

## 2021-09-03 LAB
CLINICAL INFO: NORMAL
CYTO CVX: NORMAL
CYTOLOGY CMNT CVX/VAG CYTO-IMP: NORMAL
DATE PREVIOUS BX: NORMAL
LMP START DATE: NORMAL
SL AMB PREV. PAP:: NORMAL
SPECIMEN SOURCE CVX/VAG CYTO: NORMAL

## 2024-08-19 ENCOUNTER — TELEPHONE (OUTPATIENT)
Age: 34
End: 2024-08-19

## 2024-08-19 NOTE — TELEPHONE ENCOUNTER
Tried calling patient to see if we can move her appointment time on 8/29/2024 with Dr Wren. I was unable to leave a message due to mailbox is full.    Dr Wren has a meeting at the time pt is scheduled for.    Did send a my chart message to patient to call our office in regards to the time change    Any questions please transfer to our office     Thank you

## 2024-09-23 ENCOUNTER — OFFICE VISIT (OUTPATIENT)
Age: 34
End: 2024-09-23
Payer: COMMERCIAL

## 2024-09-23 VITALS
TEMPERATURE: 97.6 F | OXYGEN SATURATION: 99 % | HEIGHT: 61 IN | WEIGHT: 109 LBS | HEART RATE: 87 BPM | DIASTOLIC BLOOD PRESSURE: 74 MMHG | SYSTOLIC BLOOD PRESSURE: 122 MMHG | BODY MASS INDEX: 20.58 KG/M2

## 2024-09-23 DIAGNOSIS — Z11.59 NEED FOR HEPATITIS C SCREENING TEST: ICD-10-CM

## 2024-09-23 DIAGNOSIS — R17 ELEVATED BILIRUBIN: ICD-10-CM

## 2024-09-23 DIAGNOSIS — Z00.00 ANNUAL PHYSICAL EXAM: Primary | ICD-10-CM

## 2024-09-23 DIAGNOSIS — Z78.9 ATTEMPTING TO CONCEIVE: ICD-10-CM

## 2024-09-23 DIAGNOSIS — K76.9 LESION OF LIVER LESS THAN 1 CM IN DIAMETER: ICD-10-CM

## 2024-09-23 DIAGNOSIS — Z11.4 SCREENING FOR HIV (HUMAN IMMUNODEFICIENCY VIRUS): ICD-10-CM

## 2024-09-23 PROCEDURE — 99385 PREV VISIT NEW AGE 18-39: CPT | Performed by: STUDENT IN AN ORGANIZED HEALTH CARE EDUCATION/TRAINING PROGRAM

## 2024-09-23 RX ORDER — METHOCARBAMOL 500 MG/1
500 TABLET, FILM COATED ORAL 4 TIMES DAILY PRN
COMMUNITY
Start: 2024-05-29

## 2024-09-23 RX ORDER — LEVONORGESTREL AND ETHINYL ESTRADIOL 0.15-0.03
1 KIT ORAL DAILY
COMMUNITY
Start: 2024-04-15

## 2024-09-23 RX ORDER — SUMATRIPTAN 25 MG/1
25 TABLET, FILM COATED ORAL
COMMUNITY
Start: 2024-03-11 | End: 2025-03-11

## 2024-09-23 RX ORDER — SUMATRIPTAN 20 MG/1
SPRAY NASAL
COMMUNITY
Start: 2024-03-11

## 2024-09-23 NOTE — PROGRESS NOTES
Adult Annual Physical  Name: Erica Jamison      : 1990      MRN: [id number]  Encounter Provider: Imelda Vilchis MD  Encounter Date: 2024   Encounter department: [facility]    Assessment & Plan  Annual physical exam       Patient presents to establish care prior to TTC which she plans to begin in next couple of months.  I have offered reassurance about cystic findings in liver found on CT urogram but have also referred to Dr. Ruiz for her expert opinion. Patient has mad mildly elevated bilirubin on CMP for past 3 years, as such I have ordered direct and total bilirubin. TSH obtained prior to TTC.   Need for hepatitis C screening test    Orders:    Hepatitis C Antibody; Future    Hepatitis C Antibody    Screening for HIV (human immunodeficiency virus)    Orders:    HIV 1/2 AG/AB w Reflex [facility] for 2 yr old and above; Future    Lesion of liver less than 1 cm in diameter    Orders:    Ambulatory Referral to Gastroenterology; Future    Comprehensive metabolic panel; Future    CBC and differential; Future    Bilirubin, total; Future    Comprehensive metabolic panel    CBC and differential    Bilirubin, total    Attempting to conceive    Orders:    Comprehensive metabolic panel; Future    CBC and differential; Future    TSH, 3rd generation with Free T4 reflex; Future    Comprehensive metabolic panel    CBC and differential    TSH, 3rd generation with Free T4 reflex    Elevated bilirubin         Immunizations and preventive care screenings were discussed with patient today. Appropriate education was printed on patient's after visit summary.         History of Present Illness     Adult Annual Physical:  Patient presents for annual physical. Patient presents to establish care. She has medical history significant for nephrolithiasis and migraine.  She would like to attempt to concieve with her  in the coming months.  She works in [employer] in [place].    She is concerned  "about subcentimeter cystic hepatic lesions visualized on CT urogram 2 months ago.   She has pelvic floor issues as well as interstitial cystitis which is controlled with diet. She has followed with pelvic  in the past.   She is following with chiropractic care to prevent migraine.  She was diagnosed with TMJ, this has improved dramatically with chiropractic care. She has also had some relief of symptoms with electrolyte drinks. .     Diet and Physical Activity:  - Diet/Nutrition: well balanced diet. Following IC diet  - Exercise:. Does yoga, Lelo    Depression Screening:  - PHQ-2 Score: 0    General Health:  - Sleep: sleeps well.  - Hearing: normal hearing bilateral ears.  - Vision: goes for regular eye exams.  - Dental: regular dental visits.    /GYN Health:  - Follows with GYN: yes.   Patient has upcoming appointment with [facility] to discuss bartholin gland cyst and TTC.   Review of Systems   Constitutional:  Negative for activity change, fatigue, fever and unexpected weight change.   HENT:  Negative for congestion and rhinorrhea.    Respiratory:  Negative for cough, chest tightness and shortness of breath.    Cardiovascular:  Negative for chest pain.   Gastrointestinal:  Negative for abdominal distention, abdominal pain, diarrhea, nausea and vomiting.   Genitourinary:  Positive for pelvic pain and urgency.   Skin:  Negative for color change.   Neurological:  Positive for headaches. Negative for seizures, speech difficulty and weakness.   Psychiatric/Behavioral:  Negative for suicidal ideas.    Objective     /74   Pulse 87   Temp 97.6 °F (36.4 °C)   Ht 5' 1\" (1.549 m)   Wt 49.4 kg (109 lb)   SpO2 99%   BMI 20.60 kg/m²   Physical Exam  Vitals and nursing note reviewed.   Constitutional:       General: She is not in acute distress.     Appearance: She is well-developed.   HENT:      Head: Normocephalic and atraumatic.   Eyes:      Conjunctiva/sclera: Conjunctivae normal. "   Cardiovascular:      Rate and Rhythm: Normal rate and regular rhythm.      Heart sounds: No murmur heard.  Pulmonary:      Effort: Pulmonary effort is normal. No respiratory distress.      Breath sounds: Normal breath sounds.   Abdominal:      General: There is no distension. There are no signs of injury.      Palpations: Abdomen is soft. There is no hepatomegaly.      Tenderness: There is no abdominal tenderness.   Musculoskeletal:         General: No swelling.      Cervical back: Neck supple.   Skin:     General: Skin is warm and dry.      Capillary Refill: Capillary refill takes less than 2 seconds.   Neurological:      Mental Status: She is alert.   Psychiatric:         Mood and Affect: Mood normal.

## 2024-12-13 ENCOUNTER — OFFICE VISIT (OUTPATIENT)
Dept: OBGYN CLINIC | Facility: CLINIC | Age: 34
End: 2024-12-13
Payer: COMMERCIAL

## 2024-12-13 VITALS — DIASTOLIC BLOOD PRESSURE: 68 MMHG | WEIGHT: 110 LBS | SYSTOLIC BLOOD PRESSURE: 102 MMHG | BODY MASS INDEX: 20.78 KG/M2

## 2024-12-13 DIAGNOSIS — R10.2 CHRONIC PELVIC PAIN IN FEMALE: ICD-10-CM

## 2024-12-13 DIAGNOSIS — G89.29 CHRONIC PELVIC PAIN IN FEMALE: ICD-10-CM

## 2024-12-13 DIAGNOSIS — Z31.9 PATIENT DESIRES PREGNANCY: Primary | ICD-10-CM

## 2024-12-13 DIAGNOSIS — M62.89 PELVIC FLOOR DYSFUNCTION: ICD-10-CM

## 2024-12-13 PROCEDURE — 99205 OFFICE O/P NEW HI 60 MIN: CPT | Performed by: OBSTETRICS & GYNECOLOGY

## 2024-12-13 RX ORDER — ALBUTEROL SULFATE 5 MG/ML
2.5 SOLUTION RESPIRATORY (INHALATION) EVERY 6 HOURS PRN
COMMUNITY

## 2024-12-15 PROBLEM — R10.2 CHRONIC PELVIC PAIN IN FEMALE: Status: ACTIVE | Noted: 2023-02-23

## 2024-12-15 PROBLEM — G89.29 CHRONIC PELVIC PAIN IN FEMALE: Status: ACTIVE | Noted: 2023-02-23

## 2024-12-15 PROBLEM — M62.89 PELVIC FLOOR DYSFUNCTION: Status: ACTIVE | Noted: 2023-08-02

## 2024-12-15 NOTE — PROGRESS NOTES
Assessment/Plan:     Diagnoses and all orders for this visit:    Patient desires pregnancy    Chronic pelvic pain in female    Pelvic floor dysfunction    Other orders  -     albuterol (5 mg/mL) 0.5 % nebulizer solution; Take 2.5 mg by nebulization every 6 (six) hours as needed for wheezing        Discussed with patient her prior gynecologic history and continued care with her physical therapist to maintain consistency and continued improvement with her pelvic pain and pelvic floor dysfunction. Discussed that the discontinuation of her OCPs will cause some reduction in control of her menstrual cycle and a return of her symptoms during this period of attempting pregnancy.   Discussed that her bartholin cyst being deep in the R labia is better for expectant management and may require addressing during her pregnancy if exacerbated.     We reviewed the different providers in the office that provide obstetric care. Discussed the rotating group call for delivering providers and opportunity for all patients to request induction of labor if desired at 39 weeks or after if no medical indication is present that would warrant an earlier delivery. Discussed that during her prenatal care, she would meet all providers to get to know all of us in the event of spontaneous labor. Reviewed delivery location of [facility] [place] and coordination with Maternal Fetal Medicine at the  Center for ultrasound evaluation of fetus during pregnancy.     I have spent a total time of 40 minutes in caring for this patient on the day of the visit/encounter including Prognosis, Risks and benefits of tx options, Instructions for management, Patient and family education, Impressions, Counseling / Coordination of care, Reviewing / ordering tests, medicine, procedures  , Obtaining or reviewing history  , and Communicating with other healthcare professionals .      Subjective:    Patient ID: Erica Jamison is a 34  y.o. female.  Chief Complaint   Patient presents with    New Patient Visit     New patient, establish care. Pt is transferring from [facility], last yearly 01/03/2024. Recently stopped ocp, Aug 2024. Pt would like to begin fertility journey in January.    2022        Erica is a 35yo G0 presenting today to establish care with plans for attempting pregnancy in the upcoming new year. She and her  are planning to start attempting conception in January 2025 and she is establishing obstetric care. She has been managed by her gynecologist, Dr. Buchanan in combination with Dr. Abril Drake, physical therapist, for her chronic pelvic pain, pelvic floor dysfunction, dyspareunia, and interstitial cystitis. She has seen a chronic pelvic pain specialist in the past though feels that the management with Dr. Buchanan and Dr. Helen Drake has been sufficient to achieve a significant improvement in her pelvic health. She was previously on Seasonale per chart review, for menstrual suppression along with pelvic floor PT for pelvic pain improvement.     She is followed by Urology, [facility], for a kidney stone that remains stable. She is also following with a chiropractor to help with pelvic pain, but also TMJ that contributes to her migraines.         The following portions of the patient's history were reviewed and updated as appropriate: allergies, current medications, past family history, past medical history, past social history, past surgical history, and problem list.    Review of Systems   Constitutional:  Negative for chills, diaphoresis and fever.   Eyes:  Negative for visual disturbance.   Respiratory:  Negative for shortness of breath.    Cardiovascular:  Negative for chest pain.   Gastrointestinal:  Negative for nausea and vomiting.   Genitourinary:  Positive for dyspareunia, pelvic pain (dysmenorrhea) and vaginal pain (dysmenorrhea and dyspareunia, pelvic floor). Negative for difficulty urinating, vaginal bleeding and  vaginal discharge.         Objective:  /68 (BP Location: Left arm, Patient Position: Sitting, Cuff Size: Standard)   Wt 49.9 kg (110 lb)   LMP 12/08/2024 (Exact Date)   BMI 20.78 kg/m²   Physical Exam  Constitutional:       General: She is not in acute distress.     Appearance: Normal appearance. She is not diaphoretic.   HENT:      Head: Normocephalic and atraumatic.   Pulmonary:      Effort: Pulmonary effort is normal. No respiratory distress.   Musculoskeletal:      Right lower leg: No edema.      Left lower leg: No edema.   Neurological:      Mental Status: She is alert and oriented to person, place, and time.   Skin:     General: Skin is warm and dry.      Coloration: Skin is not pale.   Psychiatric:         Mood and Affect: Mood normal.         Behavior: Behavior normal.         Thought Content: Thought content normal.         Judgment: Judgment normal.   Vitals and nursing note reviewed.

## 2025-02-20 ENCOUNTER — OFFICE VISIT (OUTPATIENT)
Age: 35
End: 2025-02-20
Payer: COMMERCIAL

## 2025-02-20 VITALS
RESPIRATION RATE: 18 BRPM | OXYGEN SATURATION: 98 % | HEART RATE: 110 BPM | SYSTOLIC BLOOD PRESSURE: 115 MMHG | TEMPERATURE: 99.1 F | DIASTOLIC BLOOD PRESSURE: 75 MMHG

## 2025-02-20 DIAGNOSIS — R68.89 FLU-LIKE SYMPTOMS: Primary | ICD-10-CM

## 2025-02-20 DIAGNOSIS — J01.10 ACUTE NON-RECURRENT FRONTAL SINUSITIS: ICD-10-CM

## 2025-02-20 DIAGNOSIS — J45.30 MILD PERSISTENT ASTHMA WITHOUT COMPLICATION: ICD-10-CM

## 2025-02-20 LAB
SARS-COV-2 AG UPPER RESP QL IA: NEGATIVE
SL AMB POCT RAPID FLU A: NORMAL
SL AMB POCT RAPID FLU B: NORMAL
VALID CONTROL: NORMAL
VALID CONTROL: NORMAL

## 2025-02-20 PROCEDURE — 99213 OFFICE O/P EST LOW 20 MIN: CPT | Performed by: STUDENT IN AN ORGANIZED HEALTH CARE EDUCATION/TRAINING PROGRAM

## 2025-02-20 PROCEDURE — 87804 INFLUENZA ASSAY W/OPTIC: CPT | Performed by: STUDENT IN AN ORGANIZED HEALTH CARE EDUCATION/TRAINING PROGRAM

## 2025-02-20 PROCEDURE — 87811 SARS-COV-2 COVID19 W/OPTIC: CPT | Performed by: STUDENT IN AN ORGANIZED HEALTH CARE EDUCATION/TRAINING PROGRAM

## 2025-02-20 RX ORDER — BENZONATATE 100 MG/1
100 CAPSULE ORAL 3 TIMES DAILY PRN
Qty: 20 CAPSULE | Refills: 0 | Status: SHIPPED | OUTPATIENT
Start: 2025-02-20

## 2025-02-20 NOTE — LETTER
February 20, 2025     Patient: Erica Jamison  YOB: 1990  Date of Visit: 2/20/2025      To Whom it May Concern:    Erica Jamison is under my professional care. Erica was seen in my office on 2/20/2025. Erica may return to work on 2/24/2025 .    If you have any questions or concerns, please don't hesitate to call.         Sincerely,          Imelda Vilchis MD        CC: No Recipients

## 2025-02-20 NOTE — PROGRESS NOTES
Name: Erica Jamison      : 1990      MRN: [id number]  Encounter Provider: Imelda Vilchis MD  Encounter Date: 2025   [facility]  :  Assessment & Plan  Flu-like symptoms  Negative flu A/B/COVID.  Strongly suspect viral syndrome.  Tessalon and Robitussin prescribed for symptomatic control.    Continue conservative management of acute URI, likely viral etiology, with rest, hydration and non-opioid analgesia prn.  ED precautions given.  RTC or go to ED if symptoms persist beyond 1 week or worsen.     Orders:    POCT rapid flu A and B    POCT Rapid Covid Ag    benzonatate (TESSALON PERLES) 100 mg capsule; Take 1 capsule (100 mg total) by mouth 3 (three) times a day as needed for cough    guaiFENesin (ROBITUSSIN) 100 mg/5 mL syrup; Take 10 mL (200 mg total) by mouth 3 (three) times a day as needed for cough for up to 10 days    Mild persistent asthma without complication  Well-controlled patient has not required albuterol with this upper respiratory infection       Acute non-recurrent frontal sinusitis  Suspect viral syndrome, watch and wait antibiotic prescribed to start  if symptoms unimproved.  Orders:    amoxicillin-clavulanate (AUGMENTIN) 875-125 mg per tablet; Take 1 tablet by mouth every 12 (twelve) hours for 7 days Do not start before 2025.           History of Present Illness   HPI    Erica presents for sick symptoms x1 week.  Complains of sore throat, cough, sinus pressure, fatigue, myalgia. Her symptoms started with migraine and myalgia.  Symptoms improved with liquid IV. Endorses nausea without vomiting or diarrhea.  No rashes. Subjective fevers and chills Multiple sick contacts at work. Recent travel to [place].     Review of Systems   Constitutional:  Negative for chills and fever.   HENT:  Positive for congestion, postnasal drip, sinus pressure, sinus pain and sore throat. Negative for ear pain.    Eyes:  Negative for  pain and visual disturbance.   Respiratory:  Positive for cough. Negative for shortness of breath.    Cardiovascular:  Negative for chest pain and palpitations.   Gastrointestinal:  Negative for abdominal pain and vomiting.   Genitourinary:  Negative for dysuria and hematuria.   Musculoskeletal:  Negative for arthralgias and back pain.   Skin:  Negative for color change and rash.   Neurological:  Negative for seizures and syncope.   All other systems reviewed and are negative.      Objective   /75 (BP Location: Right arm, Patient Position: Sitting)   Pulse (!) 110   Temp 99.1 °F (37.3 °C)   Resp 18   SpO2 98%      Physical Exam  Vitals and nursing note reviewed.   Constitutional:       General: She is not in acute distress.     Appearance: She is well-developed.   HENT:      Head: Normocephalic and atraumatic.      Nose: Congestion present.      Right Turbinates: Swollen.      Left Turbinates: Swollen.      Right Sinus: Maxillary sinus tenderness present.      Left Sinus: Maxillary sinus tenderness present.      Mouth/Throat:      Mouth: Mucous membranes are moist.      Pharynx: Posterior oropharyngeal erythema and postnasal drip present. No pharyngeal swelling or uvula swelling.      Tonsils: No tonsillar exudate. 1+ on the right. 1+ on the left.   Eyes:      Conjunctiva/sclera: Conjunctivae normal.   Cardiovascular:      Rate and Rhythm: Normal rate and regular rhythm.      Heart sounds: No murmur heard.  Pulmonary:      Effort: Pulmonary effort is normal. No respiratory distress.      Breath sounds: Normal breath sounds.   Abdominal:      Palpations: Abdomen is soft.      Tenderness: There is no abdominal tenderness.   Musculoskeletal:         General: No swelling.      Cervical back: Neck supple.   Skin:     General: Skin is warm and dry.   Neurological:      Mental Status: She is alert.

## 2025-02-24 ENCOUNTER — PATIENT MESSAGE (OUTPATIENT)
Age: 35
End: 2025-02-24

## 2025-02-24 DIAGNOSIS — J01.10 ACUTE NON-RECURRENT FRONTAL SINUSITIS: ICD-10-CM

## 2025-03-24 ENCOUNTER — OFFICE VISIT (OUTPATIENT)
Age: 35
End: 2025-03-24
Payer: COMMERCIAL

## 2025-03-24 VITALS
OXYGEN SATURATION: 99 % | SYSTOLIC BLOOD PRESSURE: 117 MMHG | WEIGHT: 109 LBS | BODY MASS INDEX: 21.97 KG/M2 | TEMPERATURE: 98.4 F | HEIGHT: 59 IN | DIASTOLIC BLOOD PRESSURE: 78 MMHG | HEART RATE: 94 BPM

## 2025-03-24 DIAGNOSIS — G43.009 MIGRAINE WITHOUT AURA AND WITHOUT STATUS MIGRAINOSUS, NOT INTRACTABLE: Primary | ICD-10-CM

## 2025-03-24 PROCEDURE — 99213 OFFICE O/P EST LOW 20 MIN: CPT | Performed by: STUDENT IN AN ORGANIZED HEALTH CARE EDUCATION/TRAINING PROGRAM

## 2025-03-24 RX ORDER — MAGNESIUM GLYCINATE 100 MG
100 CAPSULE ORAL
Qty: 30 CAPSULE | Refills: 1 | Status: SHIPPED | OUTPATIENT
Start: 2025-03-24

## 2025-03-24 RX ORDER — ONDANSETRON 4 MG/1
4 TABLET, ORALLY DISINTEGRATING ORAL EVERY 8 HOURS PRN
Qty: 30 TABLET | Refills: 0 | Status: SHIPPED | OUTPATIENT
Start: 2025-03-24

## 2025-03-24 NOTE — ASSESSMENT & PLAN NOTE
Patient today presents with increased frequency of migraines. She follows with [facility] and has an upcoming appointment in July 2025. She currently takes sumatriptan as needed for abortive therapy and is not on any preventative therapy at the moment. She started taking magnesium OTC (from a brand called Calm that is combined with melatonin) in addition to vitamin B12 supplements which are helping her symptoms.     Her migraines last around 3 days and are associated with neck tightness and muscle spasm. She does have some tenderness upon palpation in the greater occipital nerve region bilaterally, right > left. She reports nausea but no vomiting associated with her migraines. No new numbness, weakness, visual changes, vertigo, or gait abnormality noted. OMT performed in office today improved her symptoms slightly.     Plan:  Recommend starting supplementation with magnesium glycinate and riboflavin daily at night time to help prevent migraines  Patient benefited from short OMT session in office today, would recommend scheduling an appointment in 1-2 weeks for additional OMT  Will prescribe zofran to take as needed for nausea associated with her migraines  Encouraged patient to drink adequate amount of water and continue working on stress management. Recommend migraine diary to establish triggers and identify patterns  Continue following-up with [facility]     Orders:    Magnesium Glycinate 100 MG CAPS; Take 100 mg by mouth daily at bedtime    Riboflavin 100 MG TABS; Take 1 tablet (100 mg total) by mouth daily at bedtime    ondansetron (ZOFRAN-ODT) 4 mg disintegrating tablet; Take 1 tablet (4 mg total) by mouth every 8 (eight) hours as needed for nausea or vomiting

## 2025-03-24 NOTE — PROGRESS NOTES
Name: Erica Jamison      : 1990      MRN: [id number]  Encounter Provider: Imelda Vilchis MD  Encounter Date: 3/24/2025   Encounter department: [facility]  :  Assessment & Plan  Migraine without aura and without status migrainosus, not intractable  Patient today presents with increased frequency of migraines. She follows with [facility] and has an upcoming appointment in 2025. She currently takes sumatriptan as needed for abortive therapy and is not on any preventative therapy at the moment. She started taking magnesium OTC (from a brand called Calm that is combined with melatonin) in addition to vitamin B12 supplements which are helping her symptoms.     Her migraines last around 3 days and are associated with neck tightness and muscle spasm. She does have some tenderness upon palpation in the greater occipital nerve region bilaterally, right > left. She reports nausea but no vomiting associated with her migraines. No new numbness, weakness, visual changes, vertigo, or gait abnormality noted. OMT performed in office today improved her symptoms slightly.     Plan:  Recommend starting supplementation with magnesium glycinate and riboflavin daily at night time to help prevent migraines  Patient benefited from short OMT session in office today, would recommend scheduling an appointment in 1-2 weeks for additional OMT  Will prescribe zofran to take as needed for nausea associated with her migraines  Encouraged patient to drink adequate amount of water and continue working on stress management. Recommend migraine diary to establish triggers and identify patterns  Continue following-up with [facility]     Orders:    Magnesium Glycinate 100 MG CAPS; Take 100 mg by mouth daily at bedtime    Riboflavin 100 MG TABS; Take 1 tablet (100 mg total) by mouth daily at bedtime    ondansetron (ZOFRAN-ODT) 4 mg disintegrating tablet; Take 1 tablet (4 mg total) by mouth every 8  (eight) hours as needed for nausea or vomiting          Current Outpatient Medications   Medication Sig Dispense Refill    albuterol (5 mg/mL) 0.5 % nebulizer solution Take 2.5 mg by nebulization every 6 (six) hours as needed for wheezing      Magnesium Glycinate 100 MG CAPS Take 100 mg by mouth daily at bedtime 30 capsule 1    ondansetron (ZOFRAN-ODT) 4 mg disintegrating tablet Take 1 tablet (4 mg total) by mouth every 8 (eight) hours as needed for nausea or vomiting 30 tablet 0    Riboflavin 100 MG TABS Take 1 tablet (100 mg total) by mouth daily at bedtime 30 tablet 0    benzonatate (TESSALON PERLES) 100 mg capsule Take 1 capsule (100 mg total) by mouth 3 (three) times a day as needed for cough 20 capsule 0    levonorgestrel-ethinyl estradiol (SEASONALE) 0.15-0.03 MG per tablet Take 1 tablet by mouth daily      methocarbamol (ROBAXIN) 500 mg tablet Take 500 mg by mouth 4 (four) times a day as needed      SUMAtriptan (IMITREX) 20 MG/ACT nasal spray       SUMAtriptan (IMITREX) 25 mg tablet Take 25 mg by mouth       No current facility-administered medications for this visit.      History of Present Illness   Migraine  Associated symptoms include nausea, neck pain and photophobia. Pertinent negatives include no abdominal pain, coughing, dizziness, fever, numbness, vomiting or weakness.     Erica presents today for increasing frequency of her migraines. She has a history of migraines and follows with [facility]. She is currently prescribed sumatriptan to take as needed which will usually abort her migraine. She last had to take it this past Friday. She is not on any preventative medication at this time but did start taking magnesium (calm brand) and Vitamin B12 supplements which has improved her migraine frequency somewhat. She previously followed with a Chiropractic team which helped her TMJ and muscle tightness but reported feeling slightly worse at the last session in January. She denies dizziness or  "changes in gait. She reports that her migraines typically start with a pain or spasm in her neck and will begin in the middle of her forehead and radiate to the back of her head. She characterizes it as a pounding or spasm sensation. Robaxin in the past did not work for her. Her migraines are associated with nausea sometimes but no vomiting. She endorses photophobia and phonophobia but denies any aura. Laying in a dark room will improve her symptoms. Her migraines typically last around 3 days. No new symptoms such as visual disturbances, numbness, weakness, or vertigo. She tries to drink water but notes she can do a better job. No changes in sleep. She is working on stress management.     In addition, she is trying to conceive. She established care and is following with OBGYN for this and just started trying officially in January.     Review of Systems   Constitutional:  Negative for chills and fever.   HENT:  Negative for congestion.    Eyes:  Positive for photophobia. Negative for visual disturbance.   Respiratory:  Negative for cough and shortness of breath.    Cardiovascular:  Negative for chest pain and palpitations.   Gastrointestinal:  Positive for nausea. Negative for abdominal pain and vomiting.   Genitourinary:  Negative for difficulty urinating and dysuria.   Musculoskeletal:  Positive for neck pain.   Neurological:  Positive for headaches. Negative for dizziness, weakness, light-headedness and numbness.   Psychiatric/Behavioral:  Negative for confusion. The patient is not nervous/anxious.        Objective   /78 (BP Location: Left arm, Patient Position: Sitting, Cuff Size: Standard)   Pulse 94   Temp 98.4 °F (36.9 °C) (Temporal)   Ht 4' 11\" (1.499 m)   Wt 49.4 kg (109 lb)   SpO2 99%   BMI 22.02 kg/m²      Physical Exam  Vitals reviewed.   Constitutional:       Appearance: She is not diaphoretic.   HENT:      Head: Normocephalic and atraumatic.      Ears:      Comments: Some tenderness upon TMJ " palpation bilaterally       Nose: No congestion or rhinorrhea.      Mouth/Throat:      Mouth: Mucous membranes are moist.   Eyes:      Extraocular Movements: Extraocular movements intact.      Conjunctiva/sclera: Conjunctivae normal.      Pupils: Pupils are equal, round, and reactive to light.   Neck:      Comments: Tightness in trapezius muscles bilaterally  Cardiovascular:      Rate and Rhythm: Normal rate and regular rhythm.      Heart sounds: No murmur heard.  Pulmonary:      Effort: Pulmonary effort is normal. No respiratory distress.      Breath sounds: Normal breath sounds.   Abdominal:      Palpations: Abdomen is soft.      Tenderness: There is no guarding.   Musculoskeletal:      Cervical back: Tenderness present.   Skin:     General: Skin is warm and dry.   Neurological:      Mental Status: She is alert.      Comments: Some tenderness upon palpation of greater occipital nerve region, right > left  Symmetric strength throughout  No ataxia   Sensation to light touch intact throughout   Psychiatric:         Mood and Affect: Mood normal.         Behavior: Behavior normal.         Thought Content: Thought content normal.       Administrative Statements   I have spent a total time of 30 minutes in caring for this patient on the day of the visit/encounter including Prognosis, Risks and benefits of tx options, Instructions for management, Patient and family education, Importance of tx compliance, Risk factor reductions, Impressions, Counseling / Coordination of care, Documenting in the medical record, Reviewing/placing orders in the medical record (including tests, medications, and/or procedures), and Obtaining or reviewing history  .

## 2025-04-08 ENCOUNTER — PROCEDURE VISIT (OUTPATIENT)
Age: 35
End: 2025-04-08
Payer: COMMERCIAL

## 2025-04-08 DIAGNOSIS — G43.009 MIGRAINE WITHOUT AURA AND WITHOUT STATUS MIGRAINOSUS, NOT INTRACTABLE: ICD-10-CM

## 2025-04-08 DIAGNOSIS — M99.03 SOMATIC DYSFUNCTION OF LUMBAR REGION: ICD-10-CM

## 2025-04-08 DIAGNOSIS — M99.00 SOMATIC DYSFUNCTION OF HEAD REGION: ICD-10-CM

## 2025-04-08 DIAGNOSIS — G89.29 CHRONIC PELVIC PAIN IN FEMALE: Primary | ICD-10-CM

## 2025-04-08 DIAGNOSIS — M99.01 SOMATIC DYSFUNCTION OF CERVICAL REGION: ICD-10-CM

## 2025-04-08 DIAGNOSIS — R10.2 CHRONIC PELVIC PAIN IN FEMALE: Primary | ICD-10-CM

## 2025-04-08 DIAGNOSIS — M99.02 SOMATIC DYSFUNCTION OF THORACIC REGION: ICD-10-CM

## 2025-04-08 DIAGNOSIS — M99.05 SOMATIC DYSFUNCTION OF PELVIS REGION: ICD-10-CM

## 2025-04-08 PROCEDURE — 98927 OSTEOPATH MANJ 5-6 REGIONS: CPT

## 2025-04-08 NOTE — PROGRESS NOTES
Assessment & Plan     This is a 34 y.o. female who presents for OMT follow-up for:  1. Chronic pelvic pain in female        2. Migraine without aura and without status migrainosus, not intractable        3. Somatic dysfunction of cervical region        4. Somatic dysfunction of head region        5. Somatic dysfunction of thoracic region        6. Somatic dysfunction of pelvis region        7. Somatic dysfunction of lumbar region          Plan:   1. Patient tolerated OMT well for the above problems,  advised patient to drink fluids and can use NSAID for soreness after treatment     2. OMT Follow up in 2 weeks.    Subjective     Erica Jamison is a 34 y.o. female and is here for a OMT follow up. The patient reports migraines and back/pelvic floor pain.    Chiropractor has been helpful with back and pelvic floor pain. When this is fixed it also helps with interstitial cystitis.     Migraines worse with back/neck pain. Worse since off OCPs, attempting pregnancy. Improved with magnesium and vit B2 supplementation.     Is the patient taking Pain medication? yes, tylenol for headache  Has the patient completed physical therapy for this condition? yes  Did Patient symptoms improve from last OMT appointment?  N/a    The following portions of the patient's history were reviewed and updated as appropriate: allergies, current medications, past family history, past medical history, past social history, past surgical history, and problem list.    Review of Systems  Do you have pain that bothers you in your daily life? yes  Review of Systems   Genitourinary:  Positive for pelvic pain.   Musculoskeletal:  Positive for back pain and neck pain.   Neurological:  Positive for headaches.     Objective     OMT Exam   OMT    Performed by: Kareen Fletcher MD  Authorized by: Kareen Fletcher MD  Universal Protocol:  Procedure performed by: (Nanette Gatica MS III)  Consent: Verbal consent obtained.  Risks and benefits: risks,  benefits and alternatives were discussed  Consent given by: patient  Patient understanding: patient states understanding of the procedure being performed  Patient identity confirmed: verbally with patient      Procedure Details:     Region evaluated and treated:  Head, Cervical, Thoracic, Pelvis Innominate and Lumbar    Thoracic Information  Thoracic Region: T5 - T9 and T1 - T4  Head Details:     Examination Method:  Tissue Texture Change, Stability, Laxity, Effusions, Tone, Asymmetry, Misalignment, Crepitation, Defects, Masses, Range of Motion, Contracture and Tenderness, Pain    Severity:  Moderate    Osteopathic Findings:  OA hypertonicity  Deviation of mandible to R when opening mouth    Treatment Method:  Articulatory Treatment, Muscle Energy Treatment and Soft Tissue Treatment    Response:  Improved - The somatic dysfunction is improved but not completely resolved.    Cervical Details:     Examination Method:  Tissue Texture Change, Stability, Laxity, Effusions, Tone, Asymmetry, Misalignment, Crepitation, Defects, Masses and Tenderness, Pain    Severity:  Moderate    Osteopathic Findings:  Bilateral paraspinal hypertonicity  R>L tenderpoints C3-C6  Upper trap hypertonicity    Treatment Method:  Counterstrain Treatment, Facilitated Positional Release Treatment, Muscle Energy Treatment and Soft Tissue Treatment    Response:  Improved - The somatic dysfunction is improved but not completely resolved.    Thoracic T1 - T4 details:     Examination Method:  Tissue Texture Change, Stability, Laxity, Effusions, Tone, Asymmetry, Misalignment, Crepitation, Defects, Masses and Range of Motion, Contracture    Severity:  Moderate    Osteopathic Findings:  Paraspinal hypertonicity    Treatment Method:  Soft Tissue Treatment and Direct Treatment    Response:  Improved    Thoracic T5 - T9 details:     Examination Method:  Tissue Texture Change, Stability, Laxity, Effusions, Tone, Asymmetry, Misalignment, Crepitation, Defects,  Masses and Tenderness, Pain    Severity:  Moderate    Osteopathic Findings:  Paraspinal hypertonicity    Treatment Method:  Soft Tissue Treatment    Response:  Improved - The somatic dysfunction is improved but not completely resolved.    Lumbar details:     Examination Method:  Tissue Texture Change, Stability, Laxity, Effusions, Tone, Asymmetry, Misalignment, Crepitation, Defects, Masses and Tenderness, Pain    Severity:  Severe    Osteopathic Findings:  R psoas tenderpoint    Treatment Method:  Myofascial Release Treatment, Soft Tissue Treatment, Counterstrain Treatment and Facilitated Positional Release Treatment    Response:  Improved - The somatic dysfunction is improved but not completely resolved.    Pelvis Innominate details:     Examination Method:  Tissue Texture Change, Stability, Laxity, Effusions, Tone, Asymmetry, Misalignment, Crepitation, Defects, Masses and Tenderness, Pain    Severity:  Moderate    Osteopathic Findings:  R pelvic outflare    Treatment Method:  Muscle Energy Treatment    Response:  Improved - The somatic dysfunction is improved but not completely resolved.    Total Regions Treated:  5  Attending provider present in exam room for procedure: Yes    Kareen Fletcher MD  PGY-2 [facility]

## 2025-04-22 DIAGNOSIS — Z3A.01 LESS THAN 8 WEEKS GESTATION OF PREGNANCY: Primary | ICD-10-CM

## 2025-04-23 ENCOUNTER — APPOINTMENT (OUTPATIENT)
Dept: LAB | Facility: CLINIC | Age: 35
End: 2025-04-23
Payer: COMMERCIAL

## 2025-04-23 DIAGNOSIS — Z11.4 SCREENING FOR HIV (HUMAN IMMUNODEFICIENCY VIRUS): ICD-10-CM

## 2025-04-23 LAB
ALBUMIN SERPL BCG-MCNC: 4.6 G/DL (ref 3.5–5)
ALP SERPL-CCNC: 71 U/L (ref 34–104)
ALT SERPL W P-5'-P-CCNC: 15 U/L (ref 7–52)
ANION GAP SERPL CALCULATED.3IONS-SCNC: 7 MMOL/L (ref 4–13)
AST SERPL W P-5'-P-CCNC: 17 U/L (ref 13–39)
B-HCG SERPL-ACNC: 687.6 MIU/ML (ref 0–5)
BASOPHILS # BLD AUTO: 0.01 THOUSANDS/ÂΜL (ref 0–0.1)
BASOPHILS NFR BLD AUTO: 0 % (ref 0–1)
BILIRUB SERPL-MCNC: 1.26 MG/DL (ref 0.2–1)
BUN SERPL-MCNC: 8 MG/DL (ref 5–25)
CALCIUM SERPL-MCNC: 9.3 MG/DL (ref 8.4–10.2)
CHLORIDE SERPL-SCNC: 105 MMOL/L (ref 96–108)
CO2 SERPL-SCNC: 26 MMOL/L (ref 21–32)
CREAT SERPL-MCNC: 0.54 MG/DL (ref 0.6–1.3)
EOSINOPHIL # BLD AUTO: 0.01 THOUSAND/ÂΜL (ref 0–0.61)
EOSINOPHIL NFR BLD AUTO: 0 % (ref 0–6)
ERYTHROCYTE [DISTWIDTH] IN BLOOD BY AUTOMATED COUNT: 12.6 % (ref 11.6–15.1)
GFR SERPL CREATININE-BSD FRML MDRD: 125 ML/MIN/1.73SQ M
GLUCOSE P FAST SERPL-MCNC: 93 MG/DL (ref 65–99)
HCT VFR BLD AUTO: 46.4 % (ref 34.8–46.1)
HGB BLD-MCNC: 15.1 G/DL (ref 11.5–15.4)
IMM GRANULOCYTES # BLD AUTO: 0.04 THOUSAND/UL (ref 0–0.2)
IMM GRANULOCYTES NFR BLD AUTO: 1 % (ref 0–2)
LYMPHOCYTES # BLD AUTO: 2.02 THOUSANDS/ÂΜL (ref 0.6–4.47)
LYMPHOCYTES NFR BLD AUTO: 24 % (ref 14–44)
MCH RBC QN AUTO: 29.5 PG (ref 26.8–34.3)
MCHC RBC AUTO-ENTMCNC: 32.5 G/DL (ref 31.4–37.4)
MCV RBC AUTO: 91 FL (ref 82–98)
MONOCYTES # BLD AUTO: 0.59 THOUSAND/ÂΜL (ref 0.17–1.22)
MONOCYTES NFR BLD AUTO: 7 % (ref 4–12)
NEUTROPHILS # BLD AUTO: 5.89 THOUSANDS/ÂΜL (ref 1.85–7.62)
NEUTS SEG NFR BLD AUTO: 68 % (ref 43–75)
NRBC BLD AUTO-RTO: 0 /100 WBCS
PLATELET # BLD AUTO: 329 THOUSANDS/UL (ref 149–390)
PMV BLD AUTO: 11.4 FL (ref 8.9–12.7)
POTASSIUM SERPL-SCNC: 3.8 MMOL/L (ref 3.5–5.3)
PROT SERPL-MCNC: 7.4 G/DL (ref 6.4–8.4)
RBC # BLD AUTO: 5.12 MILLION/UL (ref 3.81–5.12)
SODIUM SERPL-SCNC: 138 MMOL/L (ref 135–147)
TSH SERPL DL<=0.05 MIU/L-ACNC: 1.01 UIU/ML (ref 0.45–4.5)
WBC # BLD AUTO: 8.56 THOUSAND/UL (ref 4.31–10.16)

## 2025-04-23 PROCEDURE — 87389 HIV-1 AG W/HIV-1&-2 AB AG IA: CPT

## 2025-04-23 PROCEDURE — 84702 CHORIONIC GONADOTROPIN TEST: CPT | Performed by: STUDENT IN AN ORGANIZED HEALTH CARE EDUCATION/TRAINING PROGRAM

## 2025-04-23 PROCEDURE — 36415 COLL VENOUS BLD VENIPUNCTURE: CPT

## 2025-04-24 ENCOUNTER — RESULTS FOLLOW-UP (OUTPATIENT)
Age: 35
End: 2025-04-24

## 2025-04-24 LAB
HCV AB SER QL: NORMAL
HIV 1+2 AB+HIV1 P24 AG SERPL QL IA: NORMAL

## 2025-05-06 NOTE — PROGRESS NOTES
"Assessment/Plan:  Diagnoses and all orders for this visit:    Normal first pregnancy confirmed, currently in first trimester  -     Ambulatory Referral to Maternal Fetal Medicine; Future  -     AMB US OB < 14 weeks single or first gestation level 1        - Viable IUP @ 7w 2d EGA  - FISH 2025  - Continue PNV  - Reviewed B6 and unisom for nausea  - Patient to call for concerns  - RTO ~ 10-12 weeks for OB intake  - call MFM for 13 week NT scan/genetic testing.           Subjective:       Patient ID: Erica Jamison 1990        Erica Jamison is a 34 y.o.  presenting to the office for pregnancy confirmation. LMP 3/15/25, placing her at 8w2d today with FISH of 2025. She is feeling well, reports some nausea but no vomiting.       OB History    Para Term  AB Living   1 0 0 0 0 0   SAB IAB Ectopic Multiple Live Births   0 0 0 0 0      # Outcome Date GA Lbr Ari/2nd Weight Sex Type Anes PTL Lv   1 Current               Obstetric Comments   Menarche 15        Allergies:  Latex, Levofloxacin, Sulfa antibiotics, Tetracycline, and Budesonide-formoterol fumarate    Medications:    Current Outpatient Medications:     albuterol (5 mg/mL) 0.5 % nebulizer solution, Take 2.5 mg by nebulization every 6 (six) hours as needed for wheezing, Disp: , Rfl:     Magnesium Glycinate 100 MG CAPS, Take 100 mg by mouth daily at bedtime, Disp: 30 capsule, Rfl: 1    ondansetron (ZOFRAN-ODT) 4 mg disintegrating tablet, Take 1 tablet (4 mg total) by mouth every 8 (eight) hours as needed for nausea or vomiting, Disp: 30 tablet, Rfl: 0    Riboflavin 100 MG TABS, Take 1 tablet (100 mg total) by mouth daily at bedtime, Disp: 30 tablet, Rfl: 0      Review of Systems:   Review of Systems       Objective:       Visit Vitals  /72   Ht 4' 11\" (1.499 m)   Wt 49 kg (108 lb)   LMP 03/15/2025 (Approximate)   BMI 21.81 kg/m²   OB Status Pregnant   Smoking Status Never   BSA 1.42 m²        GEN: The patient was alert and " oriented x3, pleasant well-appearing female in no acute distress.   CV: Regular rate  PULM: nonlabored respirations  MSK: Normal gait  : normal external female genitalia  Skin: warm, dry  Neuro: no focal deficits  Psych: normal affect and judgement, cooperative    Ultrasound:     Viability US     Gestational sac: present               Location: fundal  Yolk sac: present  Fetal pole: present               CRL: 1.12 cm = 7w2d  Cardiac activity: present               Rate: 151 bpm     Ovaries: normal appearing bilaterally  Cul de sac: absence of free fluid  Uterus: normal in appearance                 Ultrasound Probe Disinfection    A transvaginal ultrasound was performed.   Prior to use, disinfection was performed with High Level Disinfection Process (Motallyon)  Probe serial number [id number]: [id number] was used    I have spent a total time of 30 minutes in caring for this patient on the day of the visit/encounter including Diagnostic results, Prognosis, Risks and benefits of tx options, Instructions for management, Patient and family education, Importance of tx compliance, Risk factor reductions, Impressions, Counseling / Coordination of care, Documenting in the medical record, Reviewing/placing orders in the medical record (including tests, medications, and/or procedures), and Obtaining or reviewing history  .      Shikha Olivera MD  05/12/25  10:16 AM

## 2025-05-09 ENCOUNTER — PROCEDURE VISIT (OUTPATIENT)
Age: 35
End: 2025-05-09
Payer: COMMERCIAL

## 2025-05-09 DIAGNOSIS — G89.29 CHRONIC PELVIC PAIN IN FEMALE: ICD-10-CM

## 2025-05-09 DIAGNOSIS — M99.00 SOMATIC DYSFUNCTION OF HEAD REGION: ICD-10-CM

## 2025-05-09 DIAGNOSIS — M99.02 SOMATIC DYSFUNCTION OF THORACIC REGION: ICD-10-CM

## 2025-05-09 DIAGNOSIS — M99.04 SOMATIC DYSFUNCTION OF SACRAL REGION: ICD-10-CM

## 2025-05-09 DIAGNOSIS — M99.01 SOMATIC DYSFUNCTION OF CERVICAL REGION: ICD-10-CM

## 2025-05-09 DIAGNOSIS — R10.2 CHRONIC PELVIC PAIN IN FEMALE: ICD-10-CM

## 2025-05-09 DIAGNOSIS — M99.03 SOMATIC DYSFUNCTION OF LUMBAR REGION: ICD-10-CM

## 2025-05-09 DIAGNOSIS — G43.009 MIGRAINE WITHOUT AURA AND WITHOUT STATUS MIGRAINOSUS, NOT INTRACTABLE: Primary | ICD-10-CM

## 2025-05-09 DIAGNOSIS — M99.05 SOMATIC DYSFUNCTION OF PELVIS REGION: ICD-10-CM

## 2025-05-09 PROCEDURE — 98927 OSTEOPATH MANJ 5-6 REGIONS: CPT

## 2025-05-09 NOTE — PROGRESS NOTES
Assessment & Plan     This is a 34 y.o. female who presents for OMT follow-up for:  No diagnosis found.    Plan:   1. Patient tolerated OMT well for the above problems,  advised patient to drink fluids and can use NSAID for soreness after treatment     2. OMT Follow up in 2 weeks.    Subjective     Erica Jamison is a 34 y.o. female and is here for a OMT follow up. The patient reports being 5-7 weeks pregnant currently. Has OB intake next week. Has had migraine and chronic morning sickness. Occasional cramping but no vaginal bleeding.     Is the patient taking Pain medication? yes - tylenol only  Has the patient completed physical therapy for this condition? no  Did Patient symptoms improve from last OMT appointment? yes    The following portions of the patient's history were reviewed and updated as appropriate: allergies, current medications, past family history, past medical history, past social history, past surgical history, and problem list.    Review of Systems  Do you have pain that bothers you in your daily life? yes  Review of Systems   Gastrointestinal:         Mild cramping   Genitourinary:  Positive for pelvic pain. Negative for vaginal bleeding.   Musculoskeletal:  Positive for neck pain and neck stiffness.   Neurological:  Positive for headaches.        +TMJ     Objective     OMT Exam   OMT    Performed by: Kareen Fletcher MD  Authorized by: Kareen Fletcher MD    Universal Protocol:  procedure performed by consultantConsent: Verbal consent obtained.  Risks and benefits: risks, benefits and alternatives were discussed  Consent given by: patient  Patient understanding: patient states understanding of the procedure being performed  Patient identity confirmed: verbally with patient      Procedure Details:     Region evaluated and treated:  Head, Cervical, Lumbar, Sacrum/Pelvis, Pelvis Innominate and Thoracic    Thoracic Information  Thoracic Region: T1 - T4 and T5 - T9  Head Details:      Examination Method:  Tissue Texture Change, Stability, Laxity, Effusions, Tone    Severity:  Mild    Osteopathic Findings:  OA release, frontal efflurage    Treatment Method:  Myofascial Release Treatment and Soft Tissue Treatment    Response:  Resolved  - The somatic dysfunction is completed resovled without evidence of it ever having been present.    Cervical Details:     Examination Method:  Tissue Texture Change, Stability, Laxity, Effusions, Tone, Asymmetry, Misalignment, Crepitation, Defects, Masses and Range of Motion, Contracture    Severity:  Moderate    Osteopathic Findings:  R/L SCM hypertonicity bilaterally    Treatment Method:  Counterstrain Treatment, Muscle Energy Treatment, Soft Tissue Treatment and Myofascial Release Treatment    Response:  Improved - The somatic dysfunction is improved but not completely resolved.    Thoracic T1 - T4 details:     Examination Method:  Tissue Texture Change, Stability, Laxity, Effusions, Tone    Severity:  Mild    Treatment Method:  Soft Tissue Treatment and Counterstrain Treatment    Response:  Improved    Thoracic T5 - T9 details:     Examination Method:  Tissue Texture Change, Stability, Laxity, Effusions, Tone    Severity:  Mild    Treatment Method:  Soft Tissue Treatment    Response:  Improved - The somatic dysfunction is improved but not completely resolved.    Lumbar details:     Examination Method:  Tissue Texture Change, Stability, Laxity, Effusions, Tone    Severity:  Moderate    Treatment Method:  Soft Tissue Treatment and Myofascial Release Treatment    Response:  Improved - The somatic dysfunction is improved but not completely resolved.    Sacrum/Pelvis details:     Examination Method:  Asymmetry, Misalignment, Crepitation, Defects, Masses    Severity:  Mild    Treatment Method:  Balanced Ligamentous Tension, Ligamentous Articular Strain Treatment    Response:  Resolved - The somatic dysfunction is completely resolved without evidence of it ever having  been present.    Pelvis Innominate details:     Examination Method:  Asymmetry, Misalignment, Crepitation, Defects, Masses    Severity:  Mild    Osteopathic Findings:  R leg length discrepency    Treatment Method:  Articulatory Treatment and Muscle Energy Treatment    Response:  Improved - The somatic dysfunction is improved but not completely resolved.    Total Regions Treated:  6  Attending provider present in exam room for procedure: Yes    Kareen Fletcher MD  PGY-2 [facility]

## 2025-05-12 ENCOUNTER — ULTRASOUND (OUTPATIENT)
Dept: OBGYN CLINIC | Facility: CLINIC | Age: 35
End: 2025-05-12
Payer: COMMERCIAL

## 2025-05-12 VITALS
SYSTOLIC BLOOD PRESSURE: 100 MMHG | DIASTOLIC BLOOD PRESSURE: 72 MMHG | BODY MASS INDEX: 21.77 KG/M2 | HEIGHT: 59 IN | WEIGHT: 108 LBS

## 2025-05-12 DIAGNOSIS — Z34.01 NORMAL FIRST PREGNANCY CONFIRMED, CURRENTLY IN FIRST TRIMESTER: Primary | ICD-10-CM

## 2025-05-12 PROCEDURE — 76817 TRANSVAGINAL US OBSTETRIC: CPT | Performed by: OBSTETRICS & GYNECOLOGY

## 2025-05-12 PROCEDURE — 99214 OFFICE O/P EST MOD 30 MIN: CPT | Performed by: OBSTETRICS & GYNECOLOGY

## 2025-05-13 ENCOUNTER — PATIENT MESSAGE (OUTPATIENT)
Dept: OBGYN CLINIC | Facility: CLINIC | Age: 35
End: 2025-05-13

## 2025-05-13 ENCOUNTER — TELEPHONE (OUTPATIENT)
Age: 35
End: 2025-05-13

## 2025-05-13 DIAGNOSIS — M62.89 PELVIC FLOOR DYSFUNCTION: Primary | ICD-10-CM

## 2025-05-20 ENCOUNTER — OFFICE VISIT (OUTPATIENT)
Dept: GASTROENTEROLOGY | Facility: AMBULARY SURGERY CENTER | Age: 35
End: 2025-05-20

## 2025-05-20 VITALS
HEIGHT: 59 IN | OXYGEN SATURATION: 100 % | WEIGHT: 109.2 LBS | HEART RATE: 103 BPM | SYSTOLIC BLOOD PRESSURE: 108 MMHG | BODY MASS INDEX: 22.01 KG/M2 | DIASTOLIC BLOOD PRESSURE: 80 MMHG

## 2025-05-20 DIAGNOSIS — E80.6 HYPERBILIRUBINEMIA: Primary | ICD-10-CM

## 2025-05-20 NOTE — PROGRESS NOTES
[facility] - Outpatient Consultation  Erica Jamison 34 y.o. female MRN: [id number]  Encounter: [id number]    PCP:  Imelda Dave MD, [phone]  Referring Provider:  Imelda Dave MD, [phone]    Name: Erica Jamison      : 1990      MRN: [id number]  Encounter Provider: Shikha Ruiz MD  Encounter Date: 2025   Encounter department: [facility]  :  Assessment & Plan  Hyperbilirubinemia  34 y.o. female with history of asthma, interstitial cystitis, pelvic floor dysfunction and and currently pregnant (8w3d) who presents for hyperbilirubinemia.     She has had elevated Tb dating back to 2019 with peak 1.5. Her liver chemistries, synthetic function and platelets are normal. She had prior imaging with CT in  which showed subCM hepatic cysts with otherwise normal appearing liver, gallbladder and biliary tree.  Prior HCV Ab was negative in 2025. She denies excessive EtOH consumption.     She reports a family history of HCC in her grandfather who also had chronic HCV infection from a blood transfusion.     Discussed that her hyperbilirubinemia is likely due to Gilbert's disease which is a benign condition that typically does not require further work-up or intervention. However, will pursue an US abdomen given that she has not had recent abdominal imaging to exclude any biliary tract abnormalities. If normal, then she may return to liver clinic as needed.    Orders:    US abdomen complete; Future        History of Present Illness   Erica Jamison is a 34 y.o. female with history of asthma, interstitial cystitis, pelvic floor dysfunction and and currently pregnant (8w3d) who presents for hyperbilirubinemia.     She has had elevated Tb dating back to 2019 with peak 1.5. Her liver chemistries, synthetic function and platelets are normal. She had prior imaging with CT in  which showed subCM hepatic cysts with otherwise  normal appearing liver, gallbladder and biliary tree.  Prior HCV Ab was negative in April 2025.     She reports a family history of HCC in her grandfather who also had chronic HCV infection from a blood transfusion.    HPI  PAST MEDICAL/SURGICAL HISTORY:  Past Medical History:   Diagnosis Date    Asthma     Bartholin cyst     Chronic pelvic pain in female     Dysmenorrhea     Fibroid     3 small fibroids - size of pea    Interstitial cystitis     Kidney stone     Migraine     Ovarian cyst     Ovarian cyst     Pelvic floor dysfunction     Pregnancy 05/2025    Sciatic pain     TMJ (temporomandibular joint disorder)         Past Surgical History:   Procedure Laterality Date    WISDOM TOOTH EXTRACTION         FAMILY/SOCIAL HISTORY:  Family History   Problem Relation Age of Onset    Hypertension Mother     Migraines Mother     Depression Mother     Diabetes Father     Arrhythmia Father     Heart disease Father     Suicidality Maternal Grandmother     Lung cancer Maternal Grandfather     Hepatitis Maternal Grandfather     Dementia Paternal Grandmother     Alzheimer's disease Paternal Grandfather     Cancer Paternal Grandfather         Liver Cell Cancer    Breast cancer Neg Hx     Colon cancer Neg Hx     Ovarian cancer Neg Hx      Social History[1]    MEDICATIONS:  Medications Ordered Prior to Encounter[2]  Allergies[3]    Review of Systems   Constitutional: Negative.    HENT: Negative.     Eyes: Negative.    Cardiovascular: Negative.    Gastrointestinal:  Positive for constipation.   Endocrine: Negative.    Genitourinary: Negative.    Musculoskeletal: Negative.    Skin: Negative.    Allergic/Immunologic: Negative.    Neurological: Negative.    Hematological: Negative.    Psychiatric/Behavioral: Negative.      A complete review of systems is negative other than that noted above in the HPI.      Current Medications[4]  Objective   LMP 03/15/2025 (Approximate)     Physical Exam  Constitutional:       Appearance: Normal  appearance.   HENT:      Head: Normocephalic and atraumatic.     Eyes:      General: No scleral icterus.     Extraocular Movements: Extraocular movements intact.      Pupils: Pupils are equal, round, and reactive to light.     Abdominal:      General: Abdomen is flat. There is no distension.      Tenderness: There is no abdominal tenderness.     Skin:     General: Skin is warm and dry.      Coloration: Skin is not jaundiced.     Neurological:      General: No focal deficit present.      Mental Status: She is alert and oriented to person, place, and time.        Lab Results: I personally reviewed relevant lab results.     LABS/RADIOLOGY/ENDOSCOPY:  Lab Results   Component Value Date    WBC 8.56 04/23/2025    HGB 15.1 04/23/2025    HCT 46.4 (H) 04/23/2025     04/23/2025    GLUF 93 04/23/2025    BUN 8 04/23/2025    CREATININE 0.54 (L) 04/23/2025    K 3.8 04/23/2025     04/23/2025    CO2 26 04/23/2025    ALKPHOS 71 04/23/2025    ALT 15 04/23/2025    AST 17 04/23/2025    CALCIUM 9.3 04/23/2025    EGFR 125 04/23/2025    INR 0.90 12/04/2017    PTT 30 12/04/2017       Computed MELD 3.0 unavailable. One or more values for this score either were not found within the given timeframe or did not fit some other criterion.  Computed MELD-Na unavailable. One or more values for this score either were not found within the given timeframe or did not fit some other criterion.                     [1]   Social History  Tobacco Use    Smoking status: Never    Smokeless tobacco: Never   Vaping Use    Vaping status: Never Used   Substance Use Topics    Alcohol use: Yes     Alcohol/week: 1.0 standard drink of alcohol     Types: 1 Glasses of wine per week     Comment: Drink mostly specially occasions. Stick to 1-2 drinks.    Drug use: No   [2]   Current Outpatient Medications on File Prior to Visit   Medication Sig Dispense Refill    albuterol (5 mg/mL) 0.5 % nebulizer solution Take 2.5 mg by nebulization every 6 (six) hours as  needed for wheezing      Magnesium Glycinate 100 MG CAPS Take 100 mg by mouth daily at bedtime 30 capsule 1    ondansetron (ZOFRAN-ODT) 4 mg disintegrating tablet Take 1 tablet (4 mg total) by mouth every 8 (eight) hours as needed for nausea or vomiting 30 tablet 0    Riboflavin 100 MG TABS Take 1 tablet (100 mg total) by mouth daily at bedtime 30 tablet 0     No current facility-administered medications on file prior to visit.   [3]   Allergies  Allergen Reactions    Latex     Levofloxacin Myalgia and Other (See Comments)     Tendon pain with use    Sulfa Antibiotics GI Intolerance    Tetracycline GI Intolerance     Gi upset/ pins and needles in legs    Budesonide-Formoterol Fumarate Palpitations     Leg pain, pins and needles   [4]   Current Outpatient Medications   Medication Sig Dispense Refill    albuterol (5 mg/mL) 0.5 % nebulizer solution Take 2.5 mg by nebulization every 6 (six) hours as needed for wheezing      Magnesium Glycinate 100 MG CAPS Take 100 mg by mouth daily at bedtime 30 capsule 1    ondansetron (ZOFRAN-ODT) 4 mg disintegrating tablet Take 1 tablet (4 mg total) by mouth every 8 (eight) hours as needed for nausea or vomiting 30 tablet 0    Riboflavin 100 MG TABS Take 1 tablet (100 mg total) by mouth daily at bedtime 30 tablet 0     No current facility-administered medications for this visit.

## 2025-05-23 ENCOUNTER — TELEPHONE (OUTPATIENT)
Age: 35
End: 2025-05-23

## 2025-05-23 ENCOUNTER — PROCEDURE VISIT (OUTPATIENT)
Age: 35
End: 2025-05-23

## 2025-05-23 DIAGNOSIS — M99.04 SOMATIC DYSFUNCTION OF SACRAL REGION: ICD-10-CM

## 2025-05-23 DIAGNOSIS — M99.01 SOMATIC DYSFUNCTION OF CERVICAL REGION: ICD-10-CM

## 2025-05-23 DIAGNOSIS — G43.009 MIGRAINE WITHOUT AURA AND WITHOUT STATUS MIGRAINOSUS, NOT INTRACTABLE: Primary | ICD-10-CM

## 2025-05-23 DIAGNOSIS — R10.2 CHRONIC PELVIC PAIN IN FEMALE: ICD-10-CM

## 2025-05-23 DIAGNOSIS — G89.29 CHRONIC PELVIC PAIN IN FEMALE: ICD-10-CM

## 2025-05-23 DIAGNOSIS — M26.609 TMJ (TEMPOROMANDIBULAR JOINT SYNDROME): ICD-10-CM

## 2025-05-23 DIAGNOSIS — M99.00 SOMATIC DYSFUNCTION OF HEAD REGION: ICD-10-CM

## 2025-05-23 DIAGNOSIS — M99.03 SOMATIC DYSFUNCTION OF LUMBAR REGION: ICD-10-CM

## 2025-05-23 NOTE — PROGRESS NOTES
Assessment & Plan     This is a 34 y.o. female who presents for OMT follow-up for:  1. Migraine without aura and without status migrainosus, not intractable        2. Chronic pelvic pain in female        3. Somatic dysfunction of head region        4. Somatic dysfunction of cervical region        5. Somatic dysfunction of lumbar region        6. Somatic dysfunction of sacral region            Plan:   1. Patient tolerated OMT well for the above problems,  advised patient to drink fluids and can use tylenol for soreness after treatment. NO NSAIDs as patient is pregnant.     2. OMT Follow up in 4 weeks.    Carlee Jamison is a 34 y.o. female and is here for a OMT follow up. The patient reports she is tired today. Did have nausea/vomiting from morning sickness which cased migraine. Pelvic floor PT encouraged hydration. NO migraines since that day. TMJ pain is mild. Modified working environment which helped with jaw tightness.     Constipation has resolved. Treating morning sickness with zofran prn and taking pills at different time of day which is helpful.     Is the patient taking Pain medication? no  Has the patient completed physical therapy for this condition? yes  Did Patient symptoms improve from last OMT appointment? yes    The following portions of the patient's history were reviewed and updated as appropriate: allergies, current medications, past family history, past medical history, past social history, past surgical history, and problem list.    Review of Systems  Do you have pain that bothers you in your daily life? yes  Review of Systems   Musculoskeletal:  Positive for back pain.   Neurological:  Positive for headaches.       Objective     OMT Exam   OMT    Performed by: Kareen Fletcher MD  Authorized by: Kareen Fletcher MD    Universal Protocol:  procedure performed by consultantConsent: Verbal consent obtained  Risks and benefits: risks, benefits and alternatives were  discussed  Consent given by: patient  Patient understanding: patient states understanding of the procedure being performed  Patient identity confirmed: verbally with patient      Procedure Details:     Region evaluated and treated:  Head, Cervical, Lumbar and Sacrum/Pelvis    Head Details:     Examination Method:  Tissue Texture Change, Stability, Laxity, Effusions, Tone and Asymmetry, Misalignment, Crepitation, Defects, Masses    Severity:  Moderate    Osteopathic Findings:  OA hypertonicity  Hypertonic masseter R>L    Treatment Method:  Soft Tissue Treatment and Myofascial Release Treatment    Response:  Resolved  - The somatic dysfunction is completed resovled without evidence of it ever having been present.    Cervical Details:     Examination Method:  Asymmetry, Misalignment, Crepitation, Defects, Masses, Tenderness, Pain and Tissue Texture Change, Stability, Laxity, Effusions, Tone    Severity:  Moderate    Osteopathic Findings:  Tenderpoint R C4  Hypertonicity of bilateral SCM  Hypertonicity of bilateral trap    Treatment Method:  Muscle Energy Treatment, Myofascial Release Treatment, Soft Tissue Treatment, Counterstrain Treatment and Facilitated Positional Release Treatment    Response:  Improved - The somatic dysfunction is improved but not completely resolved.    Lumbar details:     Examination Method:  Tissue Texture Change, Stability, Laxity, Effusions, Tone and Asymmetry, Misalignment, Crepitation, Defects, Masses    Osteopathic Findings:  Hypertonicity of paraspinal muscles in lumbar spine  Quad lumborum insertion hypertonicity and tenderpoints    Treatment Method:  Soft Tissue Treatment, Myofascial Release Treatment and Counterstrain Treatment    Response:  Resolved - The somatic dysfunction is completely resolved without evidence of it ever having been present.    Sacrum/Pelvis details:     Examination Method:  Asymmetry, Misalignment, Crepitation, Defects, Masses    Severity:  Mild    Treatment  Method:  Articulatory Treatment    Response:  Resolved - The somatic dysfunction is completely resolved without evidence of it ever having been present.    Total Regions Treated:  4  Attending provider present in exam room for procedure: Shobha Fletcher MD  PGY-2 [facility]

## 2025-06-18 NOTE — PATIENT INSTRUCTIONS
hospitalCongratulations!! Please review our Pregnancy Essential Guide and [facility] L&D Virtual tour from our networks website.     [facility] Pregnancy Essentials Guide  [facility] ([email or web address])     [facility] - Virtual Tour (Incentive)

## 2025-06-19 ENCOUNTER — INITIAL PRENATAL (OUTPATIENT)
Dept: OBGYN CLINIC | Facility: CLINIC | Age: 35
End: 2025-06-19

## 2025-06-19 ENCOUNTER — ANCILLARY PROCEDURE (OUTPATIENT)
Facility: HOSPITAL | Age: 35
End: 2025-06-19
Attending: OBSTETRICS & GYNECOLOGY
Payer: COMMERCIAL

## 2025-06-19 ENCOUNTER — ROUTINE PRENATAL (OUTPATIENT)
Facility: HOSPITAL | Age: 35
End: 2025-06-19
Attending: OBSTETRICS & GYNECOLOGY
Payer: COMMERCIAL

## 2025-06-19 VITALS
BODY MASS INDEX: 21.57 KG/M2 | DIASTOLIC BLOOD PRESSURE: 70 MMHG | WEIGHT: 107 LBS | SYSTOLIC BLOOD PRESSURE: 118 MMHG | HEIGHT: 59 IN

## 2025-06-19 VITALS
HEIGHT: 59 IN | DIASTOLIC BLOOD PRESSURE: 68 MMHG | BODY MASS INDEX: 21.51 KG/M2 | SYSTOLIC BLOOD PRESSURE: 112 MMHG | WEIGHT: 106.7 LBS | HEART RATE: 114 BPM

## 2025-06-19 DIAGNOSIS — Z34.01 NORMAL FIRST PREGNANCY CONFIRMED, CURRENTLY IN FIRST TRIMESTER: ICD-10-CM

## 2025-06-19 DIAGNOSIS — Z36.82 ENCOUNTER FOR ANTENATAL SCREENING FOR NUCHAL TRANSLUCENCY: ICD-10-CM

## 2025-06-19 DIAGNOSIS — J45.30 MILD PERSISTENT ASTHMA WITHOUT COMPLICATION: ICD-10-CM

## 2025-06-19 DIAGNOSIS — Z31.430 ENCOUNTER OF FEMALE FOR TESTING FOR GENETIC DISEASE CARRIER STATUS FOR PROCREATIVE MANAGEMENT: ICD-10-CM

## 2025-06-19 DIAGNOSIS — Z3A.12 12 WEEKS GESTATION OF PREGNANCY: ICD-10-CM

## 2025-06-19 DIAGNOSIS — Z3A.12 12 WEEKS GESTATION OF PREGNANCY: Primary | ICD-10-CM

## 2025-06-19 DIAGNOSIS — Z83.3 FAMILY HISTORY OF DIABETES MELLITUS TYPE II: ICD-10-CM

## 2025-06-19 DIAGNOSIS — Z34.81 PRENATAL CARE, SUBSEQUENT PREGNANCY, FIRST TRIMESTER: Primary | ICD-10-CM

## 2025-06-19 DIAGNOSIS — Z36.9 ENCOUNTER FOR ANTENATAL SCREENING: ICD-10-CM

## 2025-06-19 PROCEDURE — OBC

## 2025-06-19 PROCEDURE — 76813 OB US NUCHAL MEAS 1 GEST: CPT | Performed by: OBSTETRICS & GYNECOLOGY

## 2025-06-19 PROCEDURE — 99203 OFFICE O/P NEW LOW 30 MIN: CPT | Performed by: OBSTETRICS & GYNECOLOGY

## 2025-06-19 RX ORDER — ASPIRIN 81 MG/1
162 TABLET ORAL DAILY
Qty: 60 TABLET | Refills: 3 | Status: SHIPPED | OUTPATIENT
Start: 2025-06-19

## 2025-06-19 NOTE — PROGRESS NOTES
"Consultation - Maternal Fetal Medicine   Erica Jamison 34 y.o. MRN: [id number]  Encounter: [id number]    Assessment & Plan  Erica is a 34 y.o. year-old  with FISH of 2025, by Ultrasound at 12w5d, here for consultation with ultrasound if applicable, with history notable for family history of preeclampsia.  By issue:    Problem List Items Addressed This Visit          Respiratory    Mild persistent asthma without complication    Infrequent inhaler use and has adequate inhaler supplies.            Obstetrics/Gynecology    12 weeks gestation of pregnancy - Primary    In the setting of 2 moderate risk factors for preeclampsia (first-degree relative, nulliparity), initiation of low-dose aspirin daily for the prevention of preeclampsia is advised.  This prophylactic medication is more effective when started prior to 16 weeks but can be started as late as 28 weeks.  We advise 162 mg as a way to approach a dose >=100mg and 150mg given that several studies support a higher dose of aspirin than 81mg, and discontinuation around 36 weeks is advised.         Relevant Medications    aspirin (ECOTRIN LOW STRENGTH) 81 mg EC tablet       Other    Encounter for  screening for nuchal translucency    The options for aneuploidy screening were reviewed as were their associated limitations.  \"Aneuploidy\" is defined as having extra or missing whole chromosomes, and screening does not diagnose aneuploidy but can estimate a patient's risk.  Nuchal translucency measurement alone has a 70% detection rate for trisomy 21 (Down syndrome).  Cell-free DNA (NIPS or noninvasive prenatal screening) screens for trisomies 21, 18, and 13 (Patau syndrome). With NIPS, fetal sex prediction (male or female) is an option, as is sex chromosome aneuploidy screening (normal or abnormal sex chromosome).  Diagnostic testing (chorionic villus sampling or amniocentesis) is an option for any pregnant patient and carry an approximate 1/300 " "risk of procedure-related miscarriage. After general explanation of the above, she opted to decline invasive diagnostic testing and opted to decline serum aneuploidy screening..  MSAFP screening is advised at 16-18 weeks.  A return visit in 7-8weeks time is advised for detailed anatomic survey.          Other Visit Diagnoses         Normal first pregnancy confirmed, currently in first trimester                History of Present Illness     Reason for consultation: consultation at the time of nuchal translucency    Referring physician:   Shikha Olivera Md  [address] [facility]  [address]  [place] [address]    Dear Dr. Olivera,    Thank you for your kind referral of patient Erica Jamison for MFM consultation.  Erica is a 34 y.o. year-old  at 12w5d.  Antepartum course is significant for nausea.  Past medical and past surgical histories were reviewed using an office screening tool and are notable for mild intermittent asthma.   Regarding her obstetric history, she is a primigravida. She denies current use of alcohol, drugs of abuse, tobacco, and cannabis products.  She works as a manager.  Her partner Reji age 38 is a manager as well.    Family history per our office screening tool includes her mother had a \"hole in her heart\" which was repaired, diabetes in multiple family members including father, and her mother had preeclampsia (Erica was born a month early) but is otherwise noncontributory.      Detailed history is as follows:    OB History    Para Term  AB Living   1 0 0 0 0 0   SAB IAB Ectopic Multiple Live Births   0 0 0 0 0      # Outcome Date GA Lbr Ari/2nd Weight Sex Type Anes PTL Lv   1 Current               Obstetric Comments   Menarche 15      Past Medical History[1]      Past Surgical History[2]      Social History     Social History[3]   Family History[4]      Meds/Allergies   Current Medications[5]  Allergies   Allergen Reactions    Latex     Levofloxacin Myalgia and Other " "(See Comments)     Tendon pain with use    Sulfa Antibiotics GI Intolerance    Tetracycline GI Intolerance     Gi upset/ pins and needles in legs    Budesonide-Formoterol Fumarate Palpitations     Leg pain, pins and needles     Objective   Vitals: Blood pressure 112/68, pulse (!) 114, height 4' 11\" (1.499 m), weight 48.4 kg (106 lb 11.2 oz), last menstrual period 03/15/2025.Body mass index is 21.55 kg/m².  Physical Exam  Constitutional:       General: She is not in acute distress.     Appearance: Normal appearance. She is not ill-appearing or toxic-appearing.   HENT:      Head: Normocephalic and atraumatic.      Nose: No congestion.     Eyes:      Extraocular Movements: Extraocular movements intact.     Pulmonary:      Effort: Pulmonary effort is normal. No respiratory distress.     Musculoskeletal:      Cervical back: Normal range of motion. No rigidity.     Skin:     Coloration: Skin is not jaundiced.     Neurological:      General: No focal deficit present.      Mental Status: She is alert and oriented to person, place, and time.      Coordination: Coordination normal.     Psychiatric:         Mood and Affect: Mood normal.         Behavior: Behavior normal.         Thought Content: Thought content normal.         Judgment: Judgment normal.       Fetal data from today's visit: Report with images are contained in the ultrasound report located under Chart Review tab in Epic.       At the conclusion of today's encounter, all questions were answered to apparent patient satisfaction.  Please note that while the recent CURES Act permits medical records be visible for patient review, clinical documentation is intended for utilization by healthcare professionals, and also, if the patient reviews their own medical records, they may unintentionally review clinical information such as fetal sex. Thank you very much for this kind referral and please do not hesitate to contact me with any further questions or " concerns.    Sincerely,    Jeane Alfonso MD  Attending Physician, Maternal-Fetal Medicine  [facility]    MDM:   I. Diagnoses/Problems addressed:  Self limited or minor problem: uncomplicated pregnancy  II.  Data:   III.  Risk of morbidity: Moderate (Rx management)         [1]   Past Medical History:  Diagnosis Date    Asthma     Bartholin cyst     Chronic pelvic pain in female     Dysmenorrhea     Fibroid     3 small fibroids - size of pea    Interstitial cystitis     Kidney stone     Migraine     Ovarian cyst     Ovarian cyst     Pelvic floor dysfunction     Pregnancy 05/2025    Sciatic pain     TMJ (temporomandibular joint disorder)    [2]   Past Surgical History:  Procedure Laterality Date    WISDOM TOOTH EXTRACTION     [3]   Social History  Tobacco Use    Smoking status: Never    Smokeless tobacco: Never   Vaping Use    Vaping status: Never Used   Substance Use Topics    Alcohol use: Yes     Alcohol/week: 1.0 standard drink of alcohol     Types: 1 Glasses of wine per week     Comment: Drink mostly specially occasions. Stick to 1-2 drinks.    Drug use: No   [4]   Family History  Problem Relation Name Age of Onset    Hypertension Mother      Migraines Mother      Depression Mother      Diabetes Father Giuseppe     Arrhythmia Father Giuseppe     Heart disease Father Giuseppe     Suicidality Maternal Grandmother      Lung cancer Maternal Grandfather      Hepatitis Maternal Grandfather      Dementia Paternal Grandmother      Alzheimer's disease Paternal Grandfather Wale     Cancer Paternal Grandfather Wale         Liver Cell Cancer    Breast cancer Neg Hx      Colon cancer Neg Hx      Ovarian cancer Neg Hx     [5]   Current Outpatient Medications:     albuterol (5 mg/mL) 0.5 % nebulizer solution, Take 2.5 mg by nebulization every 6 (six) hours as needed for wheezing, Disp: , Rfl:     aspirin (ECOTRIN LOW STRENGTH) 81 mg EC tablet, Take 2 tablets (162 mg total) by mouth daily Stop at 36  weeks.  Contact your OB or MFM with any side effects.  See [email or web address]://www.[email or web address].org/aspirin, Disp: 60 tablet, Rfl: 3    Magnesium Glycinate 100 MG CAPS, Take 100 mg by mouth daily at bedtime, Disp: 30 capsule, Rfl: 1    ondansetron (ZOFRAN-ODT) 4 mg disintegrating tablet, Take 1 tablet (4 mg total) by mouth every 8 (eight) hours as needed for nausea or vomiting, Disp: 30 tablet, Rfl: 0    Riboflavin 100 MG TABS, Take 1 tablet (100 mg total) by mouth daily at bedtime, Disp: 30 tablet, Rfl: 0

## 2025-06-19 NOTE — ASSESSMENT & PLAN NOTE
"The options for aneuploidy screening were reviewed as were their associated limitations.  \"Aneuploidy\" is defined as having extra or missing whole chromosomes, and screening does not diagnose aneuploidy but can estimate a patient's risk.  Nuchal translucency measurement alone has a 70% detection rate for trisomy 21 (Down syndrome).  Cell-free DNA (NIPS or noninvasive prenatal screening) screens for trisomies 21, 18, and 13 (Patau syndrome). With NIPS, fetal sex prediction (male or female) is an option, as is sex chromosome aneuploidy screening (normal or abnormal sex chromosome).  Diagnostic testing (chorionic villus sampling or amniocentesis) is an option for any pregnant patient and carry an approximate 1/300 risk of procedure-related miscarriage. After general explanation of the above, she opted to decline invasive diagnostic testing and opted to decline serum aneuploidy screening..  MSAFP screening is advised at 16-18 weeks.  A return visit in 7-8weeks time is advised for detailed anatomic survey.  "

## 2025-06-19 NOTE — PROGRESS NOTES
OB INTAKE INTERVIEW  Patient is 34 y.o. who presents for OB intake at 12 5/7wks  She is accompanied by S.O during this encounter  The father of her baby (Reji Mcrae) is involved in the pregnancy and is 38 years old.      Last Menstrual Period: 3/15/25  Ultrasound: Measured 7 weeks 2 days on 25  Estimated Date of Delivery: 25 changed by 7 week US    Signs/Symptoms of Pregnancy  Current pregnancy symptoms: Nausea  Constipation no  Headaches YES-Sometimes  Cramping/spotting No  PICA cravings no    Diabetes-  There is no height or weight on file to calculate BMI.  If patient has 1 or more, please order early 1 hour GTT  History of GDM no  BMI >35 no  History of PCOS or current metformin use (should stop for 7 days prior to 1hr GTT unless pre-existing diabetes)  no  History of LGA/macrosomic infant (4000g/9lbs) no    If patient has 2 or more, please order early 1 hour GTT  BMI>30 no  AMA no  First degree relative with type 2 diabetes YES-Father with T2DM  History of chronic HTN, hyperlipidemia, elevated A1C no  High risk race (, , ,  or ) YES-    Hypertension- if you answer yes to any of the following, please order baseline preeclampsia labs (cbc, comprehensive metabolic panel, urine protein creatinine ratio, uric acid)  History of of chronic HTN no  History of gestational HTN no  History of preeclampsia, eclampsia, or HELLP syndrome no  History of diabetes no  History of lupus,sjogrens syndrome, kidney disease no    Thyroid- if yes order TSH with reflex T4  History of thyroid disease no    Bleeding Disorder or Hx of DVT-patient or first degree relative with history of. Order the following if not done previously.   (Factor V, antithrombin III, prothrombin gene mutation, protein C and S Ag, lupus anticoagulant, anticardiolipin, beta-2 glycoprotein)   no    OB/GYN-  History of abnormal pap smear no       Date of last pap smear  21  History of HPV no  History of Herpes/HSV no  History of other STI (gonorrhea, chlamydia, trich) no  History of prior  no  History of prior  no  History of  delivery prior to 36 weeks 6 days no  History of Varicella or Vaccination Vaccinated  History of blood transfusion no  Ok for blood transfusion Yes    Substance screening-   History of tobacco use no  Currently using tobacco no  Substance Use Screen Level (N/A, LOW, HIGH) Low    MRSA Screening-   Does the pt have a hx of MRSA? no    Immunizations:  Influenza vaccine given this season Not in season  Discussed Tdap vaccine Yes  Discussed COVID Vaccine Yes    Genetic/[facility]-  Do you or your partner have a history of any of the following in yourselves or first degree relatives?  Cystic fibrosis no  Spinal muscular atrophy no  Hemoglobinopathy/Sickle Cell/Thalassemia no  Fragile X Intellectual Disability no        If no, discuss Carrier Screening being completed once in a lifetime as a standard of care lab test. Place orders for Cystic Fibrosis Gene Test (MNF655) and Spinal Muscular Atrophy DNA ([id number])      Appointment for Nuchal Translucency Ultrasound at [facility] scheduled for 25.      Interview education  [facility] Pregnancy Essentials Book reviewed, discussed and attached to their AVS Yes    Nurse/Family Partnership- patient may qualify No; referral placed No    Prenatal lab work scripts Yes  Extra labs ordered:  Cf and SMA screen (lab [facility] number provided and discussed)  1 hr GTT  Aspirin/Preeclampsia Screen    Risk Level Risk Factor Recommendation   LOW Prior Uncomplicated full-term delivery no No Aspirin recommendation        MODERATE Nulliparity YES Recommend low-dose aspirin if     BMI>30 no 2 or more moderate risk factors    Family History Preeclampsia (mother/sister) YES-Mother      35yr old or greater no     Black Race, Concern for SDOH/Low Socioeconomic no     IVF Pregnancy  no     Personal History Risks (low birth weight, prior  adverse preg outcome, >10yr preg interval) YES-Patient under 5 lbs born premature         HIGH History of Preeclampsia no Recommend low-dose aspirin if     Multifetal gestation no 1 or more high risk factors    Chronic HTN no     Type 1 or 2 Diabetes no     Renal Disease no     Autoimmune Disease  no      Contraindications to ASA therapy:  NSAID/ ASA allergy: no  Nasal polyps: no  Asthma with history of ASA induced bronchospasm: no  Relative contraindications:  History of GI bleed: no  Active peptic ulcer disease: no  Severe hepatic dysfunction: no    Patient should be recommended to take ASA 162mg during this pregnancy from 12-36wks to lower her risk of preeclampsia: ASA Therapy Discussed.          The patient has a history now or in prior pregnancy notable for:  Asthma, Migraines, Interstitial cystitis, Pelvic floor dysfunction (follows up with O.M.T and P.T.) kidney stone x1 currently follows up with urology, Fibroids x3 small, Bartholin cyst x1, Gilbert Syndrome. Per Patient MMR-vaccinated. EPDS-4 (aware of support services)      Details that I feel the provider should be aware of: This is a planned and welcomed pregnancy for parents.  Patient is experiencing some nausea and taking Zofran as needed. OTC medications and diet were discussed. 1hr GTT ordered per screen.Patient knows to call OB for symptoms and how to contact her nurse navigator. Patient was made aware to have lab orders completed before next appointment. Patient denies any questions at this point and verbalizes understanding.         PN1 visit scheduled. The patient was oriented to our practice, the navigator role, reviewed delivering physicians and [facility] for [facility]. All questions were answered.    Interviewed by: Augustina Jefferson RN  OB Nurse Navigator

## 2025-06-19 NOTE — ASSESSMENT & PLAN NOTE
In the setting of 2 moderate risk factors for preeclampsia (first-degree relative, nulliparity), initiation of low-dose aspirin daily for the prevention of preeclampsia is advised.  This prophylactic medication is more effective when started prior to 16 weeks but can be started as late as 28 weeks.  We advise 162 mg as a way to approach a dose >=100mg and 150mg given that several studies support a higher dose of aspirin than 81mg, and discontinuation around 36 weeks is advised.

## 2025-06-19 NOTE — LETTER
"2025     Shikha Olivera MD  [address]  [address]  [facility] [address]    Patient: Erica Jamison   YOB: 1990   Date of Visit: 2025       Dear Dr. Shikha Olivera MD:    Thank you for referring Erica Jamison to me for evaluation. Below are my notes for this consultation.    If you have questions, please do not hesitate to call me. I look forward to following your patient along with you.         Sincerely,        Jeane Alfonso MD        CC: No Recipients    Jeane Alfonso MD  2025  9:13 AM  Sign when Signing Visit  Consultation - Maternal Fetal Medicine   Erica Jamison 34 y.o. MRN: [id number]  Encounter: [id number]    Assessment & Plan Erica is a 34 y.o. year-old  with FISH of 2025, by Ultrasound at 12w5d, here for consultation with ultrasound if applicable, with history notable for family history of preeclampsia.  By issue:    Problem List Items Addressed This Visit          Respiratory    Mild persistent asthma without complication    Infrequent inhaler use and has adequate inhaler supplies.            Obstetrics/Gynecology    12 weeks gestation of pregnancy - Primary    In the setting of 2 moderate risk factors for preeclampsia (first-degree relative, nulliparity), initiation of low-dose aspirin daily for the prevention of preeclampsia is advised.  This prophylactic medication is more effective when started prior to 16 weeks but can be started as late as 28 weeks.  We advise 162 mg as a way to approach a dose >=100mg and 150mg given that several studies support a higher dose of aspirin than 81mg, and discontinuation around 36 weeks is advised.         Relevant Medications    aspirin (ECOTRIN LOW STRENGTH) 81 mg EC tablet       Other    Encounter for  screening for nuchal translucency    The options for aneuploidy screening were reviewed as were their associated limitations.  \"Aneuploidy\" is defined as having extra or missing whole " "chromosomes, and screening does not diagnose aneuploidy but can estimate a patient's risk.  Nuchal translucency measurement alone has a 70% detection rate for trisomy 21 (Down syndrome).  Cell-free DNA (NIPS or noninvasive prenatal screening) screens for trisomies 21, 18, and 13 (Patau syndrome). With NIPS, fetal sex prediction (male or female) is an option, as is sex chromosome aneuploidy screening (normal or abnormal sex chromosome).  Diagnostic testing (chorionic villus sampling or amniocentesis) is an option for any pregnant patient and carry an approximate 1/300 risk of procedure-related miscarriage. After general explanation of the above, she opted to decline invasive diagnostic testing and opted to decline serum aneuploidy screening..  MSAFP screening is advised at 16-18 weeks.  A return visit in 7-8weeks time is advised for detailed anatomic survey.          Other Visit Diagnoses         Normal first pregnancy confirmed, currently in first trimester                History of Present Illness    Reason for consultation: consultation at the time of nuchal translucency    Referring physician:   Shikha Olivera Md  [address]  [address]  [place] [address]    Dear Dr. Olivera,    Thank you for your kind referral of patient Erica Jamison for M consultation.  Erica is a 34 y.o. year-old  at 12w5d.  Antepartum course is significant for nausea.  Past medical and past surgical histories were reviewed using an office screening tool and are notable for mild intermittent asthma.   Regarding her obstetric history, she is a primigravida. She denies current use of alcohol, drugs of abuse, tobacco, and cannabis products.  She works as a manager.  Her partner Reji age 38 is a manager as well.    Family history per our office screening tool includes her mother had a \"hole in her heart\" which was repaired, diabetes in multiple family members including father, and her mother had preeclampsia (Erica was " "born a month early) but is otherwise noncontributory.      Detailed history is as follows:    OB History    Para Term  AB Living   1 0 0 0 0 0   SAB IAB Ectopic Multiple Live Births   0 0 0 0 0      # Outcome Date GA Lbr Rai/2nd Weight Sex Type Anes PTL Lv   1 Current               Obstetric Comments   Menarche 15      Past Medical History[1]      Past Surgical History[2]      Social History    Social History[3]   Family History[4]      Meds/Allergies  Current Medications[5]  Allergies   Allergen Reactions   • Latex    • Levofloxacin Myalgia and Other (See Comments)     Tendon pain with use   • Sulfa Antibiotics GI Intolerance   • Tetracycline GI Intolerance     Gi upset/ pins and needles in legs   • Budesonide-Formoterol Fumarate Palpitations     Leg pain, pins and needles     Objective  Vitals: Blood pressure 112/68, pulse (!) 114, height 4' 11\" (1.499 m), weight 48.4 kg (106 lb 11.2 oz), last menstrual period 03/15/2025.Body mass index is 21.55 kg/m².  Physical Exam  Constitutional:       General: She is not in acute distress.     Appearance: Normal appearance. She is not ill-appearing or toxic-appearing.   HENT:      Head: Normocephalic and atraumatic.      Nose: No congestion.     Eyes:      Extraocular Movements: Extraocular movements intact.     Pulmonary:      Effort: Pulmonary effort is normal. No respiratory distress.     Musculoskeletal:      Cervical back: Normal range of motion. No rigidity.     Skin:     Coloration: Skin is not jaundiced.     Neurological:      General: No focal deficit present.      Mental Status: She is alert and oriented to person, place, and time.      Coordination: Coordination normal.     Psychiatric:         Mood and Affect: Mood normal.         Behavior: Behavior normal.         Thought Content: Thought content normal.         Judgment: Judgment normal.       Fetal data from today's visit: Report with images are contained in the ultrasound report located under " Chart Review tab in Epic.       At the conclusion of today's encounter, all questions were answered to apparent patient satisfaction.  Please note that while the recent CURES Act permits medical records be visible for patient review, clinical documentation is intended for utilization by healthcare professionals, and also, if the patient reviews their own medical records, they may unintentionally review clinical information such as fetal sex. Thank you very much for this kind referral and please do not hesitate to contact me with any further questions or concerns.    Sincerely,    Jeane Alfonso MD  Attending Physician, Maternal-Fetal Medicine  [facility]    MDM:   I. Diagnoses/Problems addressed:  Self limited or minor problem: uncomplicated pregnancy  II.  Data:   III.  Risk of morbidity: Moderate (Rx management)         [1]   Past Medical History:  Diagnosis Date   • Asthma    • Bartholin cyst    • Chronic pelvic pain in female    • Dysmenorrhea    • Fibroid     3 small fibroids - size of pea   • Interstitial cystitis    • Kidney stone    • Migraine    • Ovarian cyst    • Ovarian cyst    • Pelvic floor dysfunction    • Pregnancy 05/2025   • Sciatic pain    • TMJ (temporomandibular joint disorder)    [2]   Past Surgical History:  Procedure Laterality Date   • WISDOM TOOTH EXTRACTION     [3]   Social History  Tobacco Use   • Smoking status: Never   • Smokeless tobacco: Never   Vaping Use   • Vaping status: Never Used   Substance Use Topics   • Alcohol use: Yes     Alcohol/week: 1.0 standard drink of alcohol     Types: 1 Glasses of wine per week     Comment: Drink mostly specially occasions. Stick to 1-2 drinks.   • Drug use: No   [4]   Family History  Problem Relation Name Age of Onset   • Hypertension Mother     • Migraines Mother     • Depression Mother     • Diabetes Father Giuseppe    • Arrhythmia Father Giuseppe    • Heart disease Father Giuseppe    • Suicidality Maternal Grandmother      • Lung cancer Maternal Grandfather     • Hepatitis Maternal Grandfather     • Dementia Paternal Grandmother     • Alzheimer's disease Paternal Grandfather Wale    • Cancer Paternal Grandfather Wale         Liver Cell Cancer   • Breast cancer Neg Hx     • Colon cancer Neg Hx     • Ovarian cancer Neg Hx     [5]   Current Outpatient Medications:   •  albuterol (5 mg/mL) 0.5 % nebulizer solution, Take 2.5 mg by nebulization every 6 (six) hours as needed for wheezing, Disp: , Rfl:   •  aspirin (ECOTRIN LOW STRENGTH) 81 mg EC tablet, Take 2 tablets (162 mg total) by mouth daily Stop at 36 weeks.  Contact your OB or MFM with any side effects.  See https://www.preeclampsia.org/[email or web address], Disp: 60 tablet, Rfl: 3  •  Magnesium Glycinate 100 MG CAPS, Take 100 mg by mouth daily at bedtime, Disp: 30 capsule, Rfl: 1  •  ondansetron (ZOFRAN-ODT) 4 mg disintegrating tablet, Take 1 tablet (4 mg total) by mouth every 8 (eight) hours as needed for nausea or vomiting, Disp: 30 tablet, Rfl: 0  •  Riboflavin 100 MG TABS, Take 1 tablet (100 mg total) by mouth daily at bedtime, Disp: 30 tablet, Rfl: 0

## 2025-06-24 ENCOUNTER — PROCEDURE VISIT (OUTPATIENT)
Age: 35
End: 2025-06-24
Payer: COMMERCIAL

## 2025-06-24 DIAGNOSIS — M99.01 SOMATIC DYSFUNCTION OF CERVICAL REGION: ICD-10-CM

## 2025-06-24 DIAGNOSIS — G43.009 MIGRAINE WITHOUT AURA AND WITHOUT STATUS MIGRAINOSUS, NOT INTRACTABLE: ICD-10-CM

## 2025-06-24 DIAGNOSIS — M99.02 SOMATIC DYSFUNCTION OF THORACIC REGION: ICD-10-CM

## 2025-06-24 DIAGNOSIS — M26.609 TMJ (TEMPOROMANDIBULAR JOINT DISORDER): Primary | ICD-10-CM

## 2025-06-24 DIAGNOSIS — M99.00 SOMATIC DYSFUNCTION OF HEAD REGION: ICD-10-CM

## 2025-06-24 PROCEDURE — 98926 OSTEOPATH MANJ 3-4 REGIONS: CPT

## 2025-06-24 NOTE — PROGRESS NOTES
Assessment & Plan     This is a 34 y.o. female who presents for OMT follow-up for:  1. TMJ (temporomandibular joint disorder)        2. Migraine without aura and without status migrainosus, not intractable        3. Somatic dysfunction of head region        4. Somatic dysfunction of cervical region        5. Somatic dysfunction of thoracic region             1. Patient tolerated OMT well for the above problems,  advised patient to drink fluids and can use NSAID for soreness after treatment     2. OMT Follow up as needed for acute illness.    Subjective     Erica Jamison is a 34 y.o. female and is here for a OMT follow up. The patient reports she chronically has migraines that have recently become more problematic since a dental visit.  Pain is worse in the back of the jaw, left side worse than right side.  She has been massaging the inside of her mouth, which helped a little bit, but has not had any relief from ice or heat.  Migraines seem to be triggered by changing from cool environments to the recently warm environment.     Is the patient taking Pain medication? yes--Tylenol only  Has the patient completed physical therapy for this condition? no  Did Patient symptoms improve from last OMT appointment? N/A    Review of Systems  Review of Systems   Constitutional:  Negative for chills and fever.   HENT:          Jaw pain   Musculoskeletal:  Positive for neck pain. Negative for back pain.   Neurological:  Positive for headaches.         Objective     OMT Exam     OMT    Performed by: Raeann Bah DO  Authorized by: Raeann Bah DO    Universal Protocol:  procedure performed by consultantConsent: Verbal consent obtained  Consent given by: patient  Patient identity confirmed: verbally with patient      Procedure Details:     Region evaluated and treated:  Head, Cervical, Left Extremities and Right Extremities    Extremity Information  Extremities: left upper extremity    Extremity  Information  Extremities: right upper extremity    Head Details:     Examination Method:  Tissue Texture Change, Stability, Laxity, Effusions, Tone, Tenderness, Pain and Passive    Severity:  Moderate    Osteopathic Findings:  Tenderness to palpation of the suboccipital musculature with marked hypertonicity  Gross fascial restriction along suture lines  Tenderness to palpation along the posterior ramus of the mandible bilaterally with associated muscular hypertonicity of bilateral masseters and medial pterygoid mm  General tenderness overlying the frontal/forehead region  Leftward deviation of the jaw with opening    Treatment Method:  Cranial Treatment, Osteopathy in the Cranial Field, Cranial Osteopathy, Myofascial Release Treatment, Direct Treatment, Counterstrain Treatment, Indirect Treatment and Muscle Energy Treatment    Response:  Improved - The somatic dysfunction is improved but not completely resolved.    Cervical Details:     Examination Method:  Tissue Texture Change, Stability, Laxity, Effusions, Tone, Tenderness, Pain, Asymmetry, Misalignment, Crepitation, Defects, Masses and Passive    Severity:  Moderate    Osteopathic Findings:  Hypertonicity of cervical paraspinal musculature with discomfort noted on passive ROM (SB/R bilaterally)  Tenderness at multiple points along PL TPs    Treatment Method:  Counterstrain Treatment, Indirect Treatment, Soft Tissue Treatment and Direct Treatment    Response:  Improved - The somatic dysfunction is improved but not completely resolved.    Right Upper Extremity details:     Examination Method:  Range of Motion, Contracture, Passive and Tenderness, Pain    Severity:  Moderate    Osteopathic Findings:  Hypertonicity and tenderness medial to the vertical border of the scapula with associated decreased ROM of retraction relative to protraction    Treatment Method:  Balanced Ligamentous Tension, Ligamentous Articular Strain Treatment, Direct Treatment, Soft Tissue  Treatment and Indirect Treatment    Response:  Improved - The somatic dysfunction is improved but not completely resolved.    Left Upper Extremity details:     Examination Method:  Tenderness, Pain, Range of Motion, Contracture and Passive    Severity:  Moderate    Osteopathic Findings:  Hypertonicity and tenderness medial to the vertical border of the scapula with associated decreased ROM of retraction relative to protraction    Treatment Method:  Balanced Ligamentous Tension, Ligamentous Articular Strain Treatment, Soft Tissue Treatment, Indirect Treatment and Direct Treatment    Response:  Improved - The somatic dysfunction is improved but not completely resolved.    Total Regions Treated:  4  Attending provider present in exam room for procedure: Yes    Raeann Bah, DO

## 2025-06-30 ENCOUNTER — PROCEDURE VISIT (OUTPATIENT)
Age: 35
End: 2025-06-30
Payer: COMMERCIAL

## 2025-06-30 DIAGNOSIS — M99.00 SOMATIC DYSFUNCTION OF HEAD REGION: ICD-10-CM

## 2025-06-30 DIAGNOSIS — M99.03 SOMATIC DYSFUNCTION OF LUMBAR REGION: ICD-10-CM

## 2025-06-30 DIAGNOSIS — M99.02 SOMATIC DYSFUNCTION OF THORACIC REGION: ICD-10-CM

## 2025-06-30 DIAGNOSIS — G43.009 MIGRAINE WITHOUT AURA AND WITHOUT STATUS MIGRAINOSUS, NOT INTRACTABLE: ICD-10-CM

## 2025-06-30 DIAGNOSIS — M26.609 TMJ (TEMPOROMANDIBULAR JOINT DISORDER): Primary | ICD-10-CM

## 2025-06-30 DIAGNOSIS — M99.01 SOMATIC DYSFUNCTION OF CERVICAL REGION: ICD-10-CM

## 2025-06-30 PROCEDURE — 98926 OSTEOPATH MANJ 3-4 REGIONS: CPT

## 2025-06-30 NOTE — PROGRESS NOTES
Assessment & Plan     This is a 34 y.o. female who presents for OMT follow-up for:  1. TMJ (temporomandibular joint disorder)        2. Migraine without aura and without status migrainosus, not intractable        3. Somatic dysfunction of head region        4. Somatic dysfunction of cervical region        5. Somatic dysfunction of thoracic region        6. Somatic dysfunction of lumbar region          Plan:   1. Patient tolerated OMT well for the above problems,  advised patient to drink fluids and can use NSAID for soreness after treatment     2. OMT Follow up in 4 weeks.    Subjective   Erica Jamison is a 34 y.o. female and is here for a OMT follow up. The patient reports symptoms have improved since last week. Dentist flared jaw pain and migraines. No headaches in the last week after OMT. Has been drinking ice water which helps with jaw pain.     Is the patient taking Pain medication? no  Has the patient completed physical therapy for this condition? yes  Did Patient symptoms improve from last OMT appointment? yes    The following portions of the patient's history were reviewed and updated as appropriate: allergies, current medications, past family history, past medical history, past social history, past surgical history, and problem list.    Review of Systems  Do you have pain that bothers you in your daily life? yes  Review of Systems   HENT:          Jaw pain   Neurological:  Positive for headaches.     Objective     OMT Exam   OMT    Performed by: Kareen Fletcher MD  Authorized by: Kareen Fletcher MD    Universal Protocol:  procedure performed by consultantConsent: Verbal consent obtained  Risks and benefits: risks, benefits and alternatives were discussed  Consent given by: patient  Patient understanding: patient states understanding of the procedure being performed  Patient identity confirmed: verbally with patient      Procedure Details:     Region evaluated and treated:  Head, Cervical,  Lumbar and Thoracic    Thoracic Information  Thoracic Region: T10 - T12  Head Details:     Examination Method:  Tissue Texture Change, Stability, Laxity, Effusions, Tone and Asymmetry, Misalignment, Crepitation, Defects, Masses    Osteopathic Findings:  Facial hypertonicity    Treatment Method:  Direct Treatment, Soft Tissue Treatment and Myofascial Release Treatment    Response:  Resolved  - The somatic dysfunction is completed resovled without evidence of it ever having been present.    Cervical Details:     Examination Method:  Tissue Texture Change, Stability, Laxity, Effusions, Tone and Asymmetry, Misalignment, Crepitation, Defects, Masses    Severity:  Moderate    Osteopathic Findings:  Paraspinal hypertonicity  C3-C4 FsRrR    Treatment Method:  Muscle Energy Treatment and Soft Tissue Treatment    Response:  Resolved - The somatic dysfunction is completely resolved without evidence of it ever having been present.    Thoracic T10 - T12 details:     Examination Method:  Tissue Texture Change, Stability, Laxity, Effusions, Tone    Severity:  Moderate    Osteopathic Findings:  Paraspinal hypertonicity  Tenderpoint L quad lumborum insertion    Treatment Method:  Muscle Energy Treatment, Myofascial Release Treatment and Soft Tissue Treatment    Response:  Improved - The somatic dysfunction is improved but not completely resolved.    Lumbar details:     Examination Method:  Tissue Texture Change, Stability, Laxity, Effusions, Tone and Range of Motion, Contracture    Severity:  Moderate    Osteopathic Findings:  FsRrR L2-L4    Treatment Method:  Muscle Energy Treatment and Soft Tissue Treatment    Response:  Improved - The somatic dysfunction is improved but not completely resolved.    Total Regions Treated:  4  Attending provider present in exam room for procedure: Yes    Kareen Fletcher MD  PGY-2 [facility]

## 2025-07-03 ENCOUNTER — TELEPHONE (OUTPATIENT)
Age: 35
End: 2025-07-03

## 2025-07-03 NOTE — TELEPHONE ENCOUNTER
Insurance calling for CPT code for labs.  No CPT code found, they were given the lab tests and Z34.81 code.

## 2025-07-08 ENCOUNTER — APPOINTMENT (OUTPATIENT)
Dept: LAB | Facility: CLINIC | Age: 35
End: 2025-07-08
Payer: COMMERCIAL

## 2025-07-08 DIAGNOSIS — Z36.9 ENCOUNTER FOR ANTENATAL SCREENING: ICD-10-CM

## 2025-07-08 DIAGNOSIS — Z31.430 ENCOUNTER OF FEMALE FOR TESTING FOR GENETIC DISEASE CARRIER STATUS FOR PROCREATIVE MANAGEMENT: ICD-10-CM

## 2025-07-08 DIAGNOSIS — Z34.81 PRENATAL CARE, SUBSEQUENT PREGNANCY, FIRST TRIMESTER: ICD-10-CM

## 2025-07-08 LAB
ABO GROUP BLD: NORMAL
BASOPHILS # BLD AUTO: 0.02 THOUSANDS/ÂΜL (ref 0–0.1)
BASOPHILS NFR BLD AUTO: 0 % (ref 0–1)
BLD GP AB SCN SERPL QL: NEGATIVE
EOSINOPHIL # BLD AUTO: 0.03 THOUSAND/ÂΜL (ref 0–0.61)
EOSINOPHIL NFR BLD AUTO: 0 % (ref 0–6)
ERYTHROCYTE [DISTWIDTH] IN BLOOD BY AUTOMATED COUNT: 12.1 % (ref 11.6–15.1)
HBV SURFACE AG SER QL: NORMAL
HCT VFR BLD AUTO: 40.8 % (ref 34.8–46.1)
HCV AB SER QL: NORMAL
HGB BLD-MCNC: 14 G/DL (ref 11.5–15.4)
HIV 1+2 AB+HIV1 P24 AG SERPL QL IA: NORMAL
IMM GRANULOCYTES # BLD AUTO: 0.05 THOUSAND/UL (ref 0–0.2)
IMM GRANULOCYTES NFR BLD AUTO: 1 % (ref 0–2)
LYMPHOCYTES # BLD AUTO: 1.93 THOUSANDS/ÂΜL (ref 0.6–4.47)
LYMPHOCYTES NFR BLD AUTO: 20 % (ref 14–44)
MCH RBC QN AUTO: 29.5 PG (ref 26.8–34.3)
MCHC RBC AUTO-ENTMCNC: 34.3 G/DL (ref 31.4–37.4)
MCV RBC AUTO: 86 FL (ref 82–98)
MONOCYTES # BLD AUTO: 0.49 THOUSAND/ÂΜL (ref 0.17–1.22)
MONOCYTES NFR BLD AUTO: 5 % (ref 4–12)
NEUTROPHILS # BLD AUTO: 7.09 THOUSANDS/ÂΜL (ref 1.85–7.62)
NEUTS SEG NFR BLD AUTO: 74 % (ref 43–75)
NRBC BLD AUTO-RTO: 0 /100 WBCS
PLATELET # BLD AUTO: 232 THOUSANDS/UL (ref 149–390)
PMV BLD AUTO: 10.3 FL (ref 8.9–12.7)
RBC # BLD AUTO: 4.75 MILLION/UL (ref 3.81–5.12)
RH BLD: POSITIVE
RUBV IGG SERPL IA-ACNC: 41.3 IU/ML
WBC # BLD AUTO: 9.61 THOUSAND/UL (ref 4.31–10.16)

## 2025-07-08 PROCEDURE — 87389 HIV-1 AG W/HIV-1&-2 AB AG IA: CPT

## 2025-07-08 PROCEDURE — 36415 COLL VENOUS BLD VENIPUNCTURE: CPT

## 2025-07-08 PROCEDURE — 86850 RBC ANTIBODY SCREEN: CPT

## 2025-07-11 ENCOUNTER — INITIAL PRENATAL (OUTPATIENT)
Dept: OBGYN CLINIC | Facility: CLINIC | Age: 35
End: 2025-07-11

## 2025-07-11 VITALS — SYSTOLIC BLOOD PRESSURE: 92 MMHG | DIASTOLIC BLOOD PRESSURE: 56 MMHG | BODY MASS INDEX: 22.14 KG/M2 | WEIGHT: 109.6 LBS

## 2025-07-11 DIAGNOSIS — Z3A.15 15 WEEKS GESTATION OF PREGNANCY: Primary | ICD-10-CM

## 2025-07-11 PROBLEM — Z3A.12 12 WEEKS GESTATION OF PREGNANCY: Status: RESOLVED | Noted: 2025-06-19 | Resolved: 2025-07-11

## 2025-07-11 PROCEDURE — PNV: Performed by: OBSTETRICS & GYNECOLOGY

## 2025-07-11 PROCEDURE — 87591 N.GONORRHOEAE DNA AMP PROB: CPT | Performed by: OBSTETRICS & GYNECOLOGY

## 2025-07-11 PROCEDURE — 87491 CHLMYD TRACH DNA AMP PROBE: CPT | Performed by: OBSTETRICS & GYNECOLOGY

## 2025-07-11 NOTE — ASSESSMENT & PLAN NOTE
Cramping: no  Bleeding: no  LOF: no  NT/13 week scan scheduled: yes  AFP ordered if indicated: yes  Prenatal labs complete (including Heb B, HIV): yes but RPR and GTT missing    Pap collected: no, UTD  GC collected: yes  Pelvis feels adequate for AWA: yes  Plans to breastfeed: yes  Weight gain discussed. Exercise encouraged.   Oriented to practice/delivery location.     RTO in 4 weeks.      Orders:    Chlamydia/GC amplified DNA by PCR    Alpha fetoprotein, maternal; Future

## 2025-07-11 NOTE — PROGRESS NOTES
2nd Trimester Visit  Name: Erica Jamison      : 1990      MRN: [id number]  Encounter Provider: Shikha Olivera MD  Encounter Date: 2025   Encounter department: [facility]    34 y.o.  at 15w6d presenting for routine OB visit at 15w6d.:  Assessment & Plan  15 weeks gestation of pregnancy    Cramping: no  Bleeding: no  LOF: no  NT/13 week scan scheduled: yes  AFP ordered if indicated: yes  Prenatal labs complete (including Heb B, HIV): yes but RPR and GTT missing    Pap collected: no, UTD  GC collected: yes  Pelvis feels adequate for AWA: yes  Plans to breastfeed: yes  Weight gain discussed. Exercise encouraged.   Oriented to practice/delivery location.     RTO in 4 weeks.      Orders:    Chlamydia/GC amplified DNA by PCR    Alpha fetoprotein, maternal; Future        Scheduled for level II US.  Reviewed common discomforts of pregnancy in second trimester and warning signs.  Advised to continue medications and return in 4 weeks.    History of Present Illness     She reports doing well.  Deniesno uterine contractions or persistent cramping.  Denies vaginal bleeding or leaking of fluid.no  Reports fetal movement.yes       Current Outpatient Medications   Medication Instructions    albuterol 2.5 mg, Every 6 hours PRN    aspirin (ECOTRIN LOW STRENGTH) 162 mg, Oral, Daily, Stop at 36 weeks.  Contact your OB or MFM with any side effects.  See https://www.preeclampsia.org/[email or web address]    Magnesium Glycinate 100 mg, Oral, Daily at bedtime    ondansetron (ZOFRAN-ODT) 4 mg, Oral, Every 8 hours PRN    Prenatal Vit-Fe Fumarate-FA (PRENATAL VITAMIN PO) 1 tablet, Daily    Riboflavin 100 mg, Oral, Daily at bedtime     Objective   BP 92/56   Wt 49.7 kg (109 lb 9.6 oz)   LMP 03/15/2025 (Approximate)   BMI 22.14 kg/m²      BP: Blood Pressure: 92/56  Wt: 49.7 kg (109 lb 9.6 oz); Body mass index is 22.14 kg/m².; TWG=0.272 kg (9.6 oz)  Fetal Heart Rate: 150;      Abdomen:  ”soft”,”non tender”        Pregnancy Problems (from 05/12/25 to present)       No problems associated with this episode.

## 2025-07-13 LAB
C TRACH DNA SPEC QL NAA+PROBE: NEGATIVE
N GONORRHOEA DNA SPEC QL NAA+PROBE: NEGATIVE

## 2025-07-14 ENCOUNTER — TELEPHONE (OUTPATIENT)
Age: 35
End: 2025-07-14

## 2025-07-17 ENCOUNTER — APPOINTMENT (OUTPATIENT)
Dept: LAB | Facility: CLINIC | Age: 35
End: 2025-07-17
Payer: COMMERCIAL

## 2025-07-17 ENCOUNTER — CLINICAL SUPPORT (OUTPATIENT)
Facility: HOSPITAL | Age: 35
End: 2025-07-17
Attending: OBSTETRICS & GYNECOLOGY
Payer: COMMERCIAL

## 2025-07-17 ENCOUNTER — TELEPHONE (OUTPATIENT)
Dept: OBGYN CLINIC | Facility: CLINIC | Age: 35
End: 2025-07-17

## 2025-07-17 DIAGNOSIS — Z36.0 ENCOUNTER FOR ANTENATAL SCREENING FOR CHROMOSOMAL ANOMALIES: ICD-10-CM

## 2025-07-17 DIAGNOSIS — Z3A.16 16 WEEKS GESTATION OF PREGNANCY: Primary | ICD-10-CM

## 2025-07-17 LAB
BACTERIA UR QL AUTO: NORMAL /HPF
BILIRUB UR QL STRIP: NEGATIVE
CLARITY UR: CLEAR
COLOR UR: COLORLESS
GLUCOSE 1H P 50 G GLC PO SERPL-MCNC: 103 MG/DL (ref 70–134)
GLUCOSE UR STRIP-MCNC: NEGATIVE MG/DL
HGB UR QL STRIP.AUTO: NEGATIVE
KETONES UR STRIP-MCNC: NEGATIVE MG/DL
LEUKOCYTE ESTERASE UR QL STRIP: NEGATIVE
NITRITE UR QL STRIP: NEGATIVE
NON-SQ EPI CELLS URNS QL MICRO: NORMAL /HPF
PH UR STRIP.AUTO: 6.5 [PH]
PROT UR STRIP-MCNC: NEGATIVE MG/DL
RBC #/AREA URNS AUTO: NORMAL /HPF
SP GR UR STRIP.AUTO: 1.01 (ref 1–1.03)
TREPONEMA PALLIDUM IGG+IGM AB [PRESENCE] IN SERUM OR PLASMA BY IMMUNOASSAY: NORMAL
UROBILINOGEN UR STRIP-ACNC: <2 MG/DL
WBC #/AREA URNS AUTO: NORMAL /HPF

## 2025-07-17 PROCEDURE — 36415 COLL VENOUS BLD VENIPUNCTURE: CPT | Performed by: OBSTETRICS & GYNECOLOGY

## 2025-07-17 PROCEDURE — 82105 ALPHA-FETOPROTEIN SERUM: CPT | Performed by: OBSTETRICS & GYNECOLOGY

## 2025-07-17 NOTE — PROGRESS NOTES
Patient chose to have [facility] [id number] Non-Invasive Prenatal Screen [id number] PLUS w/ SCA, WITH fetal sex.  Patient choose to be billed through insurance.     Patient given brochure and is aware [facility] will contact patient's insurance and coordinate coverage.  Provided [facility] contact information. General inquiries [phone], Cost estimates [phone] and [facility] Billing [phone]. Website [email or web address].     Blood collection tubes labeled with patient identifiers (name, medical record number, and date of birth).     Filled out [facility] order form. Patient chose to have blood drawn in our office at time of visit. NIPS was drawn from right arm with a butterfly needle by jarod baca .       If patient chose to have blood work drawn at a [facility] lab we requested patient notify MFM (via phone call or "[employer] message) when blood collected so office can follow up on results.       Maternal Fetal Medicine will have results in approximately 5-7 business days and will call patient or notify via "[employer].  Patient aware viewing lab result online will reveal fetal sex if ordered.    Patient verbalized understanding of all instructions and no questions at this time.

## 2025-07-17 NOTE — TELEPHONE ENCOUNTER
Patient was called for 2nd trimester follow up.  Left a voicemail.  Patient was reminded that she can message this nurse via My Chart and If  having any symptoms or concerns to Please call [phone].      KATHE Jackman  OB Nurse Navigator

## 2025-07-19 LAB
2ND TRIMESTER 4 SCREEN SERPL-IMP: NORMAL
AFP ADJ MOM SERPL: 1.22
AFP INTERP AMN-IMP: NORMAL
AFP INTERP SERPL-IMP: NORMAL
AFP INTERP SERPL-IMP: NORMAL
AFP SERPL-MCNC: 54.9 NG/ML
AGE AT DELIVERY: 33.2 YR
BACTERIA UR CULT: NORMAL
GA METHOD: NORMAL
GA: 16.7 WEEKS
IDDM PATIENT QL: NO
MULTIPLE PREGNANCY: NO
NEURAL TUBE DEFECT RISK FETUS: 6110 %

## 2025-07-21 LAB
CITATION REF LAB TEST: NORMAL
ETHNIC BACKGROUND STATED: NORMAL
GENE DIS ANL CARRIER INTERP-IMP: NORMAL
GENE STUDIED ID: NORMAL
LAB DIRECTOR NAME PROVIDER: NORMAL
LABORATORY COMMENT REPORT: NORMAL
Lab: NORMAL
REASON FOR REFERRAL (NARRATIVE): NORMAL
RECOMMENDATION PATIENT DOC-IMP: NORMAL
REF LAB TEST METHOD: NORMAL
SMN1 GENE MUT ANL BLD/T: NORMAL
SPECIMEN PREPARATION: NORMAL

## 2025-07-23 LAB
CFDNA.FET/CFDNA.TOTAL SFR FETUS: NORMAL %
CFDNA.FET/CFDNA.TOTAL SFR FETUS: NORMAL %
CITATION REF LAB TEST: NORMAL
FET 13+18+21+X+Y ANEUP PLAS.CFDNA: NEGATIVE
FET CHR 21 TS PLAS.CFDNA QL: NEGATIVE
FET CHR 21 TS PLAS.CFDNA QL: NEGATIVE
FET MS X RISK WBC.DNA+CFDNA QL: NOT DETECTED
FET SEX PLAS.CFDNA DOSAGE CFDNA: NORMAL
FET TS 13 RISK PLAS.CFDNA QL: NEGATIVE
FET X + Y ANEUP RISK PLAS.CFDNA SEQ-IMP: NOT DETECTED
GA EST FROM CONCEPTION DATE: NORMAL D
GESTATIONAL AGE > 9:: YES
LAB DIRECTOR NAME PROVIDER: NORMAL
LABORATORY COMMENT REPORT: NORMAL
LIMITATIONS OF THE TEST: NORMAL
NEGATIVE PREDICTIVE VALUE: NORMAL
PERFORMANCE CHARACTERISTICS: NORMAL
POSITIVE PREDICTIVE VALUE: NORMAL
REF LAB TEST METHOD: NORMAL
SL AMB NOTE:: NORMAL
TEST PERFORMANCE INFO SPEC: NORMAL

## 2025-07-28 ENCOUNTER — PROCEDURE VISIT (OUTPATIENT)
Age: 35
End: 2025-07-28
Payer: COMMERCIAL

## 2025-07-28 DIAGNOSIS — M99.02 SOMATIC DYSFUNCTION OF THORACIC REGION: ICD-10-CM

## 2025-07-28 DIAGNOSIS — M99.01 SOMATIC DYSFUNCTION OF CERVICAL REGION: ICD-10-CM

## 2025-07-28 DIAGNOSIS — M99.03 SOMATIC DYSFUNCTION OF LUMBAR REGION: ICD-10-CM

## 2025-07-28 DIAGNOSIS — Z3A.18 18 WEEKS GESTATION OF PREGNANCY: ICD-10-CM

## 2025-07-28 DIAGNOSIS — G43.009 MIGRAINE WITHOUT AURA AND WITHOUT STATUS MIGRAINOSUS, NOT INTRACTABLE: Primary | ICD-10-CM

## 2025-07-28 DIAGNOSIS — M26.609 TMJ (TEMPOROMANDIBULAR JOINT DISORDER): ICD-10-CM

## 2025-07-28 DIAGNOSIS — M99.05 SOMATIC DYSFUNCTION OF PELVIS REGION: ICD-10-CM

## 2025-07-28 DIAGNOSIS — M99.00 SOMATIC DYSFUNCTION OF HEAD REGION: ICD-10-CM

## 2025-07-28 PROCEDURE — 98927 OSTEOPATH MANJ 5-6 REGIONS: CPT

## 2025-08-06 ENCOUNTER — ROUTINE PRENATAL (OUTPATIENT)
Dept: OBGYN CLINIC | Facility: CLINIC | Age: 35
End: 2025-08-06

## 2025-08-06 VITALS — SYSTOLIC BLOOD PRESSURE: 100 MMHG | DIASTOLIC BLOOD PRESSURE: 60 MMHG | BODY MASS INDEX: 23.23 KG/M2 | WEIGHT: 115 LBS

## 2025-08-06 DIAGNOSIS — Z3A.19 19 WEEKS GESTATION OF PREGNANCY: ICD-10-CM

## 2025-08-06 DIAGNOSIS — Z34.02 PRIMIGRAVIDA IN SECOND TRIMESTER: Primary | ICD-10-CM

## 2025-08-06 PROCEDURE — PNV: Performed by: OBSTETRICS & GYNECOLOGY

## 2025-08-08 ENCOUNTER — ANCILLARY PROCEDURE (OUTPATIENT)
Facility: HOSPITAL | Age: 35
End: 2025-08-08
Attending: OBSTETRICS & GYNECOLOGY
Payer: COMMERCIAL

## 2025-08-08 ENCOUNTER — ROUTINE PRENATAL (OUTPATIENT)
Facility: HOSPITAL | Age: 35
End: 2025-08-08
Attending: OBSTETRICS & GYNECOLOGY
Payer: COMMERCIAL

## 2025-08-08 VITALS
HEART RATE: 112 BPM | WEIGHT: 116 LBS | HEIGHT: 59 IN | DIASTOLIC BLOOD PRESSURE: 68 MMHG | SYSTOLIC BLOOD PRESSURE: 98 MMHG | BODY MASS INDEX: 23.39 KG/M2

## 2025-08-08 DIAGNOSIS — O43.192 MARGINAL INSERTION OF UMBILICAL CORD AFFECTING MANAGEMENT OF MOTHER IN SECOND TRIMESTER: ICD-10-CM

## 2025-08-08 DIAGNOSIS — J45.909 ASTHMA AFFECTING PREGNANCY IN SECOND TRIMESTER: ICD-10-CM

## 2025-08-08 DIAGNOSIS — O99.512 ASTHMA AFFECTING PREGNANCY IN SECOND TRIMESTER: ICD-10-CM

## 2025-08-08 DIAGNOSIS — Z36.86 ENCOUNTER FOR ANTENATAL SCREENING FOR CERVICAL LENGTH: ICD-10-CM

## 2025-08-08 DIAGNOSIS — Z36.3 ENCOUNTER FOR ANTENATAL SCREENING FOR MALFORMATIONS: Primary | ICD-10-CM

## 2025-08-08 DIAGNOSIS — Z3A.19 19 WEEKS GESTATION OF PREGNANCY: ICD-10-CM
